# Patient Record
Sex: MALE | Race: OTHER | Employment: FULL TIME | URBAN - METROPOLITAN AREA
[De-identification: names, ages, dates, MRNs, and addresses within clinical notes are randomized per-mention and may not be internally consistent; named-entity substitution may affect disease eponyms.]

---

## 2017-01-09 ENCOUNTER — OFFICE VISIT (OUTPATIENT)
Dept: SURGERY | Facility: CLINIC | Age: 47
End: 2017-01-09

## 2017-01-09 ENCOUNTER — TELEPHONE (OUTPATIENT)
Dept: NEUROLOGY | Facility: CLINIC | Age: 47
End: 2017-01-09

## 2017-01-09 DIAGNOSIS — G56.01 CARPAL TUNNEL SYNDROME ON RIGHT: ICD-10-CM

## 2017-01-09 DIAGNOSIS — G56.02 CARPAL TUNNEL SYNDROME ON LEFT: Primary | ICD-10-CM

## 2017-01-09 PROCEDURE — 99243 OFF/OP CNSLTJ NEW/EST LOW 30: CPT | Performed by: PLASTIC SURGERY

## 2017-01-09 PROCEDURE — 99212 OFFICE O/P EST SF 10 MIN: CPT | Performed by: PLASTIC SURGERY

## 2017-01-09 NOTE — H&P
Ainsley Vega is a 40year old male that presents with Patient presents with:  Carpal Tunnel Syndrome: Left hand  .     REFERRED BY:  James Downing     Pacemaker: No  Latex Allergy: no  Coumadin: No  Plavix: No  Other anticoagulants: No  Cardiac stents: No volar hand at times. Able to complete ADL's. Current Outpatient Prescriptions:  Methotrexate Sodium 2.5 MG Oral Tab Take 8 tablets every Friday. Disp: 40 tablet Rfl: 5   Indomethacin ER 75 MG Oral Cap CR Take one daily with food.  Disp: 90 capsule History Narrative     Family History   Problem Relation Age of Onset   • Diabetes Father    • Hypertension Father    • Cancer Mother      cervical   • Diabetes Mother    • Hypertension Mother    • Depression Sister    • Hypertension Brother        PHYSICAL requires surgery. We discussed the procedure at length, including risks as indicated on the Surgical Request Form. Questions were answered and the patient wishes to proceed with treatment.     Some symptoms may persist   Some symptoms may not improve   So

## 2017-01-18 ENCOUNTER — TELEPHONE (OUTPATIENT)
Dept: NEUROLOGY | Facility: CLINIC | Age: 47
End: 2017-01-18

## 2017-01-19 ENCOUNTER — OFFICE VISIT (OUTPATIENT)
Dept: NEUROLOGY | Facility: CLINIC | Age: 47
End: 2017-01-19

## 2017-01-19 DIAGNOSIS — G56.03 BILATERAL CARPAL TUNNEL SYNDROME: Primary | ICD-10-CM

## 2017-01-19 PROCEDURE — 95886 MUSC TEST DONE W/N TEST COMP: CPT | Performed by: OTHER

## 2017-01-19 PROCEDURE — 95911 NRV CNDJ TEST 9-10 STUDIES: CPT | Performed by: OTHER

## 2017-01-19 NOTE — PATIENT INSTRUCTIONS
As of October 6th 2014, the Drug Enforcement Agency [facility]) is reclassifying all hydrocodone combination medications from Schedule III to Schedule II. This includes medications such as Norco, Vicodin, Lortab, Zohydro, and Vicoprofen.     What this means for y chart.  •

## 2017-01-20 NOTE — PROCEDURES
[address]  Phone: [phone]  Fax: [phone]          Patient: Phil Snowden YOB: 1970  Patient ID: [id number] Hand Dominance: right ? Abnormal interference pattern was found in R. ABD POLL BREVIS, L. ABD POLL BREVIS. Conclusion: Mr. Gregg Orozco, relates a 3 year history of fairly severe left carpal tunnel symptoms of pain, numbness, paresthesias, weakness.   He has had carpal tunnel B.Elbow ADM 7.40 9.8 5.16 B. Elbow - Wrist 26 4.43 59   L ULNAR - ADM      Wrist ADM 2.60 8.1 5.57 Wrist - ADM 8        B. Elbow ADM 7.29 9.0 6.20 B. Elbow - Wrist 27 4.69 58       Sensory NCS      Nerve / Sites Rec.  Site Onset Lat Peak Lat NP Amp PP Amp S

## 2017-01-26 ENCOUNTER — TELEPHONE (OUTPATIENT)
Dept: SURGERY | Facility: CLINIC | Age: 47
End: 2017-01-26

## 2017-01-26 ENCOUNTER — OFFICE VISIT (OUTPATIENT)
Dept: SURGERY | Facility: CLINIC | Age: 47
End: 2017-01-26

## 2017-01-26 DIAGNOSIS — G56.01 CARPAL TUNNEL SYNDROME ON RIGHT: ICD-10-CM

## 2017-01-26 DIAGNOSIS — G56.02 CARPAL TUNNEL SYNDROME ON LEFT: Primary | ICD-10-CM

## 2017-01-26 PROCEDURE — 99214 OFFICE O/P EST MOD 30 MIN: CPT | Performed by: PLASTIC SURGERY

## 2017-01-26 PROCEDURE — 99212 OFFICE O/P EST SF 10 MIN: CPT | Performed by: PLASTIC SURGERY

## 2017-01-26 RX ORDER — HYDROCODONE BITARTRATE AND ACETAMINOPHEN 7.5; 325 MG/1; MG/1
1 TABLET ORAL
Qty: 10 TABLET | Refills: 0 | Status: SHIPPED | OUTPATIENT
Start: 2017-01-26 | End: 2017-02-27

## 2017-01-26 NOTE — PROGRESS NOTES
Pt in the office today to review EMG results from 1/19/17. Last seen in our office 1/9/17 for bilateral carpal tunnel syndrome. Left hand worse then right. C/o constant numbness of volar left hand including LTH, LIF and LMF.  Numbness at night only of R weeks.    ++++++++++++++++++++++++++++++++++++++++++++++++++++++++++++++++++    UPDATE:      46-year-old male, right-hand-dominant, with a 4 year history of progressive numbness. Left thumb index middle constant with burning.   The hand is weak and he has post-operative instructions. The dressing must be carefully cared for, must remain dry,  and cannot be removed under any circumstance. Consistent elevation of the hand above heart level is critical.  Therapy will be necessary post-operatively.   Failure t OTHER SURGICAL HISTORY  2011    Comment right thigh cellulitis       Social History   Marital Status:   Spouse Name: N/A    Years of Education: N/A  Number of Children: N/A     Occupational History  None on file     Social History Main Topics   [employer]

## 2017-01-26 NOTE — PROGRESS NOTES
Pre-op teaching for surgery given to pt. Completed Request for surgery. Went over [employer], Elevation Handout, Narcotic Prescription handout and Hand Surgery Handout. Prescription for Norco given to pt with instructions.   All ques

## 2017-02-03 ENCOUNTER — OFFICE VISIT (OUTPATIENT)
Dept: RHEUMATOLOGY | Facility: CLINIC | Age: 47
End: 2017-02-03

## 2017-02-03 ENCOUNTER — TELEPHONE (OUTPATIENT)
Dept: RHEUMATOLOGY | Facility: CLINIC | Age: 47
End: 2017-02-03

## 2017-02-03 VITALS
SYSTOLIC BLOOD PRESSURE: 137 MMHG | DIASTOLIC BLOOD PRESSURE: 92 MMHG | HEIGHT: 71.5 IN | WEIGHT: 230.88 LBS | BODY MASS INDEX: 31.62 KG/M2 | HEART RATE: 85 BPM

## 2017-02-03 DIAGNOSIS — M87.052 AVASCULAR NECROSIS OF HIP, LEFT (HCC): ICD-10-CM

## 2017-02-03 DIAGNOSIS — G56.03 BILATERAL CARPAL TUNNEL SYNDROME: ICD-10-CM

## 2017-02-03 DIAGNOSIS — M06.1 ADULT-ONSET STILL'S DISEASE (HCC): Primary | ICD-10-CM

## 2017-02-03 PROCEDURE — 99212 OFFICE O/P EST SF 10 MIN: CPT | Performed by: INTERNAL MEDICINE

## 2017-02-03 PROCEDURE — 20526 THER INJECTION CARP TUNNEL: CPT | Performed by: INTERNAL MEDICINE

## 2017-02-03 RX ORDER — METHYLPREDNISOLONE ACETATE 80 MG/ML
40 INJECTION, SUSPENSION INTRA-ARTICULAR; INTRALESIONAL; INTRAMUSCULAR; SOFT TISSUE ONCE
Status: COMPLETED | OUTPATIENT
Start: 2017-02-03 | End: 2017-02-03

## 2017-02-03 NOTE — TELEPHONE ENCOUNTER
Pt called back in stating he can make it in today at 1pm.  Spoke to Loreto who stated it is ok as long as he is here by 1pm - relayed message to pt, he is on his way now and will be there by 1pm.

## 2017-02-03 NOTE — PROCEDURES
Joint Injection  Pre-procedure care:  Consent was obtained, Procedure/risks were explained, Questions were answered, Patient was prepped and draped in the usual sterile fashion, Correct side and site confirmed and Correct patient identified  Adult-onset st

## 2017-02-03 NOTE — PROGRESS NOTES
HPI:    Patient ID: Mar Victor is a 40year old male.     HPI Comments: Tyler Brothers is a 52-year-old gentleman with history of adult-onset still's disease, which presented with fevers, rash, and inflammatory arthritis, which was treated with high-dose steroids rich hydrocodone-acetaminophen 7.5-325 MG Oral Tab Take one every 6 hours PRN pain. Disp: 120 tablet Rfl: 0   Cyclobenzaprine HCl 10 MG Oral Tab Take 10 mg by mouth as needed for Muscle spasms.  Disp:  Rfl:    LISINOPRIL 20 MG Oral Tab TAKE 1 TABLET BY MOUTH E Placed This Encounter  therapeutic carpal tunnel injection    Meds This Visit:  No prescriptions requested or ordered in this encounter    Imaging & Referrals:  None       RX#[id number]

## 2017-02-03 NOTE — TELEPHONE ENCOUNTER
Pt is calling state that he is having pain in the hand pt state that he spoke with a the surgeon and they inform him that it was ok for him to get the cortisone injection  Pt is requesting to speak with a RN

## 2017-02-03 NOTE — TELEPHONE ENCOUNTER
Please see below. Pt stated he is having severe left hand pain. He is schedule for surgery for 3/3/17 with Dr. Jerman Blum. Pt stated the only thing that helps for pain is cortisone injection. Reports Dr. Giovany Pedro does not do cortisone injections.  Pt last

## 2017-02-08 NOTE — TELEPHONE ENCOUNTER
Spoke with pt, will bring on Fri. 02/10 FMLA form and will sign release at [facility].  NK

## 2017-02-10 NOTE — TELEPHONE ENCOUNTER
Spoke to [employer] @ [facility] P[employer] (call ref# [id number])    Effective date: 1/1/17    Individual Deduct: Does not apply  Family Deduct: $ 3,250.00 with $ 3,106.81 met  Individual OOP: $ 6,000.00 with $ 925.16 met  Family OOP:$ 12,000.00 with $ 3,106.81 met  Co-I

## 2017-02-23 ENCOUNTER — TELEPHONE (OUTPATIENT)
Dept: SURGERY | Facility: CLINIC | Age: 47
End: 2017-02-23

## 2017-02-23 NOTE — H&P
H&P UPDATE:      NEEDS R ECTR -minimal symptoms    1/9/17 multiple carpal tunnel steroid injections on the left    Pacemaker: No  Latex Allergy: no  Coumadin: No  Plavix: No  Other anticoagulants: No  Cardiac stents: No    HAND DOMINANCE: Right  Profession Allergies:   No Known Allergies    Past Medical History   Diagnosis Date   • Tinnitus    • Still's disease ([facility] [address]    • Pneumonia 2010     left lower lobe - out pt.  antibiotics   • MRSA (methicillin resistant Staphylococcus aureus) 2011     MRSA groin hand.  C/o constant at times sharp pain of L volar hand.  Rates pain 10/10.  R volar hand with pain at night only.  Taking Norco , slightly effective. Wakes pt every night. Wears splints bilaterally at night, effective for R hand only.   Weakness of L hand motion with excellent wrist hyperextension  Bilateral thenar intrinsics intact symmetric    2 point discrimination left thumb 7, all others 5  Tinel left thumb index middle, right negative  Phalen left thumb index middle, right index middle ring    IMPRESS and could lead to permanent pain, stiffness, weakness, and loss of function. Even with satisfactory healing, the hand/digit may not regain normal ROM or normal function.     PLAN RECTR; Sharlon Castleman in approximately 6 weeks              Hernán Lu PMH:  Topics    Smoking status:  Former Smoker    For 10.00 Years        Types:  Cigarettes      Quit date:  07/25/2012      Smokeless tobacco:  Former [facility]     Quit date:  07/25/2012      Comment:  1 pk would last a week      Alcohol Use:  Yes    0.0 oz/week

## 2017-02-23 NOTE — TELEPHONE ENCOUNTER
Spoke with patient. All changes to medications and allergies, per patient report, have been documented in the medical record.      Patient  has not been  hospitalized, or had any surgical procedures since last seen in our office on 1/26/17    Pt did ha

## 2017-02-27 DIAGNOSIS — M87.052 AVASCULAR NECROSIS OF HIP, LEFT (HCC): Primary | ICD-10-CM

## 2017-02-27 RX ORDER — HYDROCODONE BITARTRATE AND ACETAMINOPHEN 7.5; 325 MG/1; MG/1
TABLET ORAL
Qty: 120 TABLET | Refills: 0 | Status: SHIPPED | OUTPATIENT
Start: 2017-02-27 | End: 2017-04-08

## 2017-02-27 NOTE — TELEPHONE ENCOUNTER
Pt stts refill on Rx hydrocodone 7.5-325mg for 120 tablets. Pt stts he is out of medication.  Please call when script is ready for pickup         Current Outpatient Prescriptions:                    hydrocodone-acetaminophen 7.5-325 MG Oral Tab Take one zofia

## 2017-02-27 NOTE — TELEPHONE ENCOUNTER
Pt contacted aware script approved and to  at [facility] [employer] . Pt verbalized understanding.

## 2017-02-27 NOTE — TELEPHONE ENCOUNTER
LOV: 2/3/17 LR 12/14/16 #120  Future Appointments  Date Time Provider Renan Thurston   3/3/2017 9:00 AM Ary Hyatt MD [facility]   3/6/2017 9:30 AM Maddison CUELLARBFLESLYE [facility]   3/8/2017 10:00 AM Maddison SUNSHINEDR

## 2017-02-28 NOTE — OR PREOP
Pt. stated he has been on COA diet aid-instructed to stop and call Dr. Viktoriya Sy office to let him know.

## 2017-03-03 ENCOUNTER — ANESTHESIA EVENT (OUTPATIENT)
Dept: SURGERY | Facility: HOSPITAL | Age: 47
End: 2017-03-03
Payer: COMMERCIAL

## 2017-03-03 ENCOUNTER — HOSPITAL DOCUMENTATION (OUTPATIENT)
Dept: SURGERY | Facility: CLINIC | Age: 47
End: 2017-03-03

## 2017-03-03 ENCOUNTER — HOSPITAL ENCOUNTER (OUTPATIENT)
Facility: HOSPITAL | Age: 47
Setting detail: HOSPITAL OUTPATIENT SURGERY
Discharge: HOME | End: 2017-03-03
Attending: PLASTIC SURGERY | Admitting: PLASTIC SURGERY
Payer: COMMERCIAL

## 2017-03-03 ENCOUNTER — ANESTHESIA (OUTPATIENT)
Dept: SURGERY | Facility: HOSPITAL | Age: 47
End: 2017-03-03
Payer: COMMERCIAL

## 2017-03-03 ENCOUNTER — SURGERY (OUTPATIENT)
Age: 47
End: 2017-03-03

## 2017-03-03 VITALS
SYSTOLIC BLOOD PRESSURE: 118 MMHG | OXYGEN SATURATION: 97 % | HEART RATE: 53 BPM | TEMPERATURE: 98 F | BODY MASS INDEX: 30.26 KG/M2 | RESPIRATION RATE: 18 BRPM | DIASTOLIC BLOOD PRESSURE: 76 MMHG | HEIGHT: 71.5 IN | WEIGHT: 221 LBS

## 2017-03-03 DIAGNOSIS — G56.02 CARPAL TUNNEL SYNDROME ON LEFT: Primary | ICD-10-CM

## 2017-03-03 DIAGNOSIS — G56.01 CARPAL TUNNEL SYNDROME ON RIGHT: ICD-10-CM

## 2017-03-03 PROCEDURE — 64450 NJX AA&/STRD OTHER PN/BRANCH: CPT | Performed by: ANESTHESIOLOGY

## 2017-03-03 PROCEDURE — 01N54ZZ RELEASE MEDIAN NERVE, PERCUTANEOUS ENDOSCOPIC APPROACH: ICD-10-PCS | Performed by: PLASTIC SURGERY

## 2017-03-03 PROCEDURE — 29848 WRIST ENDOSCOPY/SURGERY: CPT | Performed by: PLASTIC SURGERY

## 2017-03-03 RX ORDER — HYDROMORPHONE HYDROCHLORIDE 1 MG/ML
0.2 INJECTION, SOLUTION INTRAMUSCULAR; INTRAVENOUS; SUBCUTANEOUS EVERY 5 MIN PRN
Status: DISCONTINUED | OUTPATIENT
Start: 2017-03-03 | End: 2017-03-03

## 2017-03-03 RX ORDER — HYDROCODONE BITARTRATE AND ACETAMINOPHEN 5; 325 MG/1; MG/1
2 TABLET ORAL AS NEEDED
Status: COMPLETED | OUTPATIENT
Start: 2017-03-03 | End: 2017-03-03

## 2017-03-03 RX ORDER — LIDOCAINE HYDROCHLORIDE 5 MG/ML
INJECTION, SOLUTION INFILTRATION; INTRAVENOUS AS NEEDED
Status: DISCONTINUED | OUTPATIENT
Start: 2017-03-03 | End: 2017-03-03 | Stop reason: SURG

## 2017-03-03 RX ORDER — ACETAMINOPHEN 325 MG/1
650 TABLET ORAL ONCE
Status: COMPLETED | OUTPATIENT
Start: 2017-03-03 | End: 2017-03-03

## 2017-03-03 RX ORDER — MORPHINE SULFATE 4 MG/ML
4 INJECTION, SOLUTION INTRAMUSCULAR; INTRAVENOUS EVERY 10 MIN PRN
Status: DISCONTINUED | OUTPATIENT
Start: 2017-03-03 | End: 2017-03-03

## 2017-03-03 RX ORDER — HYDROCODONE BITARTRATE AND ACETAMINOPHEN 7.5; 325 MG/1; MG/1
1 TABLET ORAL EVERY 4 HOURS PRN
Status: DISCONTINUED | OUTPATIENT
Start: 2017-03-03 | End: 2017-03-03

## 2017-03-03 RX ORDER — NALOXONE HYDROCHLORIDE 0.4 MG/ML
80 INJECTION, SOLUTION INTRAMUSCULAR; INTRAVENOUS; SUBCUTANEOUS AS NEEDED
Status: DISCONTINUED | OUTPATIENT
Start: 2017-03-03 | End: 2017-03-03

## 2017-03-03 RX ORDER — HYDROMORPHONE HYDROCHLORIDE 1 MG/ML
0.6 INJECTION, SOLUTION INTRAMUSCULAR; INTRAVENOUS; SUBCUTANEOUS EVERY 5 MIN PRN
Status: DISCONTINUED | OUTPATIENT
Start: 2017-03-03 | End: 2017-03-03

## 2017-03-03 RX ORDER — SODIUM CHLORIDE, SODIUM LACTATE, POTASSIUM CHLORIDE, CALCIUM CHLORIDE 600; 310; 30; 20 MG/100ML; MG/100ML; MG/100ML; MG/100ML
INJECTION, SOLUTION INTRAVENOUS CONTINUOUS
Status: DISCONTINUED | OUTPATIENT
Start: 2017-03-03 | End: 2017-03-03

## 2017-03-03 RX ORDER — SODIUM CHLORIDE, SODIUM LACTATE, POTASSIUM CHLORIDE, CALCIUM CHLORIDE 600; 310; 30; 20 MG/100ML; MG/100ML; MG/100ML; MG/100ML
INJECTION, SOLUTION INTRAVENOUS CONTINUOUS PRN
Status: DISCONTINUED | OUTPATIENT
Start: 2017-03-03 | End: 2017-03-03 | Stop reason: SURG

## 2017-03-03 RX ORDER — KETOROLAC TROMETHAMINE 30 MG/ML
INJECTION, SOLUTION INTRAMUSCULAR; INTRAVENOUS AS NEEDED
Status: DISCONTINUED | OUTPATIENT
Start: 2017-03-03 | End: 2017-03-03 | Stop reason: SURG

## 2017-03-03 RX ORDER — HYDROMORPHONE HYDROCHLORIDE 1 MG/ML
0.4 INJECTION, SOLUTION INTRAMUSCULAR; INTRAVENOUS; SUBCUTANEOUS EVERY 5 MIN PRN
Status: DISCONTINUED | OUTPATIENT
Start: 2017-03-03 | End: 2017-03-03

## 2017-03-03 RX ORDER — METOCLOPRAMIDE 10 MG/1
10 TABLET ORAL ONCE
Status: COMPLETED | OUTPATIENT
Start: 2017-03-03 | End: 2017-03-03

## 2017-03-03 RX ORDER — MORPHINE SULFATE 10 MG/ML
6 INJECTION, SOLUTION INTRAMUSCULAR; INTRAVENOUS EVERY 10 MIN PRN
Status: DISCONTINUED | OUTPATIENT
Start: 2017-03-03 | End: 2017-03-03

## 2017-03-03 RX ORDER — HYDROCODONE BITARTRATE AND ACETAMINOPHEN 5; 325 MG/1; MG/1
1 TABLET ORAL AS NEEDED
Status: COMPLETED | OUTPATIENT
Start: 2017-03-03 | End: 2017-03-03

## 2017-03-03 RX ORDER — MIDAZOLAM HYDROCHLORIDE 1 MG/ML
INJECTION INTRAMUSCULAR; INTRAVENOUS AS NEEDED
Status: DISCONTINUED | OUTPATIENT
Start: 2017-03-03 | End: 2017-03-03 | Stop reason: SURG

## 2017-03-03 RX ORDER — ONDANSETRON 2 MG/ML
4 INJECTION INTRAMUSCULAR; INTRAVENOUS ONCE AS NEEDED
Status: DISCONTINUED | OUTPATIENT
Start: 2017-03-03 | End: 2017-03-03

## 2017-03-03 RX ORDER — LIDOCAINE HYDROCHLORIDE 10 MG/ML
INJECTION, SOLUTION EPIDURAL; INFILTRATION; INTRACAUDAL; PERINEURAL AS NEEDED
Status: DISCONTINUED | OUTPATIENT
Start: 2017-03-03 | End: 2017-03-03 | Stop reason: SURG

## 2017-03-03 RX ORDER — FAMOTIDINE 20 MG/1
20 TABLET ORAL ONCE
Status: COMPLETED | OUTPATIENT
Start: 2017-03-03 | End: 2017-03-03

## 2017-03-03 RX ORDER — MORPHINE SULFATE 2 MG/ML
2 INJECTION, SOLUTION INTRAMUSCULAR; INTRAVENOUS EVERY 10 MIN PRN
Status: DISCONTINUED | OUTPATIENT
Start: 2017-03-03 | End: 2017-03-03

## 2017-03-03 RX ADMIN — KETOROLAC TROMETHAMINE 30 MG: 30 INJECTION, SOLUTION INTRAMUSCULAR; INTRAVENOUS at 10:15:00

## 2017-03-03 RX ADMIN — SODIUM CHLORIDE, SODIUM LACTATE, POTASSIUM CHLORIDE, CALCIUM CHLORIDE: 600; 310; 30; 20 INJECTION, SOLUTION INTRAVENOUS at 09:11:00

## 2017-03-03 RX ADMIN — LIDOCAINE HYDROCHLORIDE 50 ML: 5 INJECTION, SOLUTION INFILTRATION; INTRAVENOUS at 09:20:00

## 2017-03-03 RX ADMIN — LIDOCAINE HYDROCHLORIDE 50 MG: 10 INJECTION, SOLUTION EPIDURAL; INFILTRATION; INTRACAUDAL; PERINEURAL at 09:14:00

## 2017-03-03 RX ADMIN — SODIUM CHLORIDE, SODIUM LACTATE, POTASSIUM CHLORIDE, CALCIUM CHLORIDE: 600; 310; 30; 20 INJECTION, SOLUTION INTRAVENOUS at 10:13:00

## 2017-03-03 RX ADMIN — MIDAZOLAM HYDROCHLORIDE 2 MG: 1 INJECTION INTRAMUSCULAR; INTRAVENOUS at 09:11:00

## 2017-03-03 NOTE — ANESTHESIA PROCEDURE NOTES
Peripheral Block    Anesthesiologist:  Romina Peters  CRNA:  LESS, [address]  Performed by:   Anesthesiologist and CRNA  Start Time:  3/3/2017 9:15 AM  End Time:  3/3/2017 9:22 AM  Site Identification: surface landmarks    Reason for Block: at surgeon's req

## 2017-03-03 NOTE — BRIEF OP NOTE
Cristy 46  Brief Op Note     Marilin Crawford Location: OR   [facility] [id number] MRN [id number]   Admission Date 3/3/2017 Operation Date 3/3/2017   Attending Physician Lorrie Grant MD Operating Physician Yessenia Chen MD

## 2017-03-03 NOTE — ANESTHESIA PREPROCEDURE EVALUATION
Anesthesia PreOp Note    HPI:     Claudene Apo is a 40year old male who presents for preoperative consultation requested by: Trang Beltre MD    Date of Surgery: 3/3/2017    Procedure(s):  ENDOSCOPIC CARPAL TUNNEL RELEASE  Indication: Carpal tunnel differently: Take 75 mg by mouth nightly. Take one daily with food. ) Disp: 90 capsule Rfl: 1 Past Week at Unknown time   Cyclobenzaprine HCl 10 MG Oral Tab Take 10 mg by mouth as needed for Muscle spasms.  Disp:  Rfl:  Past Month at Unknown time   LISINOPR 135/83 and his pulse is 65. His respiration is 14 and oxygen saturation is 96%.     02/28/17  1212 03/03/17  0748   BP:  135/83   Pulse:  65   Temp:  97.7 °F (36.5 °C)   TempSrc:  Oral   Resp:  14   Height: 5' 11.5\" 5' 11.5\"   Weight: 224 lb 221 lb   SpO2

## 2017-03-03 NOTE — H&P (VIEW-ONLY)
H&P UPDATE:      NEEDS R ECTR -minimal symptoms    1/9/17 multiple carpal tunnel steroid injections on the left    Pacemaker: No  Latex Allergy: no  Coumadin: No  Plavix: No  Other anticoagulants: No  Cardiac stents: No    HAND DOMINANCE: Right  Profession Allergies:   No Known Allergies    Past Medical History   Diagnosis Date   • Tinnitus    • Still's disease ([facility] [address]    • Pneumonia 2010     left lower lobe - out pt.  antibiotics   • MRSA (methicillin resistant Staphylococcus aureus) 2011     MRSA groin hand.  C/o constant at times sharp pain of L volar hand.  Rates pain 10/10.  R volar hand with pain at night only.  Taking Norco , slightly effective. Wakes pt every night. Wears splints bilaterally at night, effective for R hand only.   Weakness of L hand motion with excellent wrist hyperextension  Bilateral thenar intrinsics intact symmetric    2 point discrimination left thumb 7, all others 5  Tinel left thumb index middle, right negative  Phalen left thumb index middle, right index middle ring    IMPRESS and could lead to permanent pain, stiffness, weakness, and loss of function. Even with satisfactory healing, the hand/digit may not regain normal ROM or normal function.     MESERET Sagastume in approximately 6 weeks              Rebekah Rader PMH:  Topics    Smoking status:  Former Smoker    For 10.00 Years        Types:  Cigarettes      Quit date:  07/25/2012      Smokeless tobacco:  Former [facility]     Quit date:  07/25/2012      Comment:  1 pk would last a week      Alcohol Use:  Yes    0.0 oz/week

## 2017-03-03 NOTE — INTERVAL H&P NOTE
Pre-op Diagnosis: Carpal tunnel syndrome     The above referenced H&P was reviewed by Gela Rhoades MD on 3/3/2017, the patient was examined and no significant changes have occurred in the patient's condition since the H&P was performed.   I discus

## 2017-03-03 NOTE — ANESTHESIA POSTPROCEDURE EVALUATION
Patient: Pardeep Becker    Procedure Summary     Date Anesthesia Start Anesthesia Stop Room / Location    03/03/17 0911 1023 [address] [facility] MAIN OR 01 / 300 [facility] MAIN OR       Procedure Diagnosis Surgeon Responsible Provider    ENDOSCOPIC CARPAL TUNNEL RELEASE (Left ) (Carpal

## 2017-03-04 NOTE — OPERATIVE REPORT
[facility]    PATIENT'S NAME: Marshal Gar   ATTENDING PHYSICIAN: Migdalia Pham MD   OPERATING PHYSICIAN: Migdalia Pham MD   PATIENT ACCOUNT#:   [id number]    LOCATION:  [address]  MEDICAL RECORD #:   [id number]       KECIA knife, and a pull knife. We had excellent herniation of palmar fat into the cannula. The cannula and endoscope were withdrawn. A curved dissector was used to document complete release of the fascia proximally and the transverse carpal ligament distally.

## 2017-03-06 ENCOUNTER — OFFICE VISIT (OUTPATIENT)
Dept: SURGERY | Facility: CLINIC | Age: 47
End: 2017-03-06

## 2017-03-06 ENCOUNTER — TELEPHONE (OUTPATIENT)
Dept: SURGERY | Facility: CLINIC | Age: 47
End: 2017-03-06

## 2017-03-06 DIAGNOSIS — M62.81 DISTAL MUSCLE WEAKNESS: ICD-10-CM

## 2017-03-06 DIAGNOSIS — M25.642 STIFFNESS OF JOINT, HAND, LEFT: Primary | ICD-10-CM

## 2017-03-06 DIAGNOSIS — M87.052 AVASCULAR NECROSIS OF HIP, LEFT (HCC): Primary | ICD-10-CM

## 2017-03-06 NOTE — PROGRESS NOTES
Carpal Tunnel Post - Op Note:    Subjective: I need some more pain medication.       Objective:  Occupational Therapy completed the following educational areas status post elective carpal tunnel procedure:    1)  Dressing was removed and handwashing techniq

## 2017-03-06 NOTE — TELEPHONE ENCOUNTER
Left message for post-op pt to call the office with any questions and/or concerns. He is to be seen in OT Monday 3-6-17. I will check with patient during visit for questions and/or issues. Dr. Eden Spencer notified.

## 2017-03-06 NOTE — TELEPHONE ENCOUNTER
Patient here for OT. Pt c/o moderate to severe pain and states he had a \"rough\" weekend due to pain control. Inquired why he did not call the office, no reason given. Arrived with hand elevated, but hand has moderate edema.   Importance of elevation di

## 2017-03-15 ENCOUNTER — OFFICE VISIT (OUTPATIENT)
Dept: SURGERY | Facility: CLINIC | Age: 47
End: 2017-03-15

## 2017-03-15 DIAGNOSIS — M62.81 DISTAL MUSCLE WEAKNESS: ICD-10-CM

## 2017-03-15 DIAGNOSIS — M25.642 STIFFNESS OF JOINT, HAND, LEFT: Primary | ICD-10-CM

## 2017-03-15 PROCEDURE — 97165 OT EVAL LOW COMPLEX 30 MIN: CPT | Performed by: OCCUPATIONAL THERAPIST

## 2017-03-15 PROCEDURE — 99070 SPECIAL SUPPLIES PHYS/QHP: CPT | Performed by: OCCUPATIONAL THERAPIST

## 2017-03-15 NOTE — PROGRESS NOTES
OCCUPATIONAL THERAPY EVALUATION:   Marilin Alselma   [id number]       SUBJECTIVE:    HX of Injury: Progressive left hand pain and numbness. Chief Complaint:   None. Precautions: Limited use of the left hand. Premorbid Functional Status: Independent w/ Occ. weeks or a total of 4 visits. Pt. was advised regarding the findings of this evaluation and agrees to the plan of care. Carmenza Rocha OTRL       I have reviewed the treatment plan and concur.    Lona Gloria MD

## 2017-03-23 ENCOUNTER — OFFICE VISIT (OUTPATIENT)
Dept: SURGERY | Facility: CLINIC | Age: 47
End: 2017-03-23

## 2017-03-23 DIAGNOSIS — G56.02 CARPAL TUNNEL SYNDROME ON LEFT: Primary | ICD-10-CM

## 2017-03-23 DIAGNOSIS — M62.81 DISTAL MUSCLE WEAKNESS: ICD-10-CM

## 2017-03-23 DIAGNOSIS — M25.642 STIFFNESS OF JOINT, HAND, LEFT: Primary | ICD-10-CM

## 2017-03-23 DIAGNOSIS — G56.01 CARPAL TUNNEL SYNDROME ON RIGHT: ICD-10-CM

## 2017-03-23 PROCEDURE — 99024 POSTOP FOLLOW-UP VISIT: CPT | Performed by: PLASTIC SURGERY

## 2017-03-23 PROCEDURE — 97110 THERAPEUTIC EXERCISES: CPT | Performed by: OCCUPATIONAL THERAPIST

## 2017-03-23 PROCEDURE — 99212 OFFICE O/P EST SF 10 MIN: CPT | Performed by: PLASTIC SURGERY

## 2017-03-23 NOTE — PROGRESS NOTES
Subjective: There is some discomfort with direct pressure in the palm of the left hand. Objective:     Current level of performance:  ADL: Independent. Work: Returning to full unrestricted duty. Leisure: Not discussed.     Measurements/Tests:  ROM:

## 2017-03-23 NOTE — PROGRESS NOTES
Surgery 1: LECTR  - Date: 03/03/17  - Days Since: 20                   Patient doing well. Has c/o discomfort in center of palm at wrist.  States pain is gone, but tingling is still present intermittently. He is pleased pain is improved.   Surgical wounds

## 2017-04-08 DIAGNOSIS — M87.052 AVASCULAR NECROSIS OF HIP, LEFT (HCC): Primary | ICD-10-CM

## 2017-04-08 NOTE — TELEPHONE ENCOUNTER
Pt is asking for a refill on his medication. Please advise when ready for . Pt is out of medication, please advise.        Current outpatient prescriptions:   •  hydrocodone-acetaminophen 7.5-325 MG Oral Tab, Take one every 6 hours PRN pain., Disp:

## 2017-04-10 RX ORDER — HYDROCODONE BITARTRATE AND ACETAMINOPHEN 7.5; 325 MG/1; MG/1
TABLET ORAL
Qty: 120 TABLET | Refills: 0 | Status: SHIPPED | OUTPATIENT
Start: 2017-04-10 | End: 2017-07-05

## 2017-05-15 ENCOUNTER — OFFICE VISIT (OUTPATIENT)
Dept: INTERNAL MEDICINE CLINIC | Facility: CLINIC | Age: 47
End: 2017-05-15

## 2017-05-15 VITALS
SYSTOLIC BLOOD PRESSURE: 136 MMHG | WEIGHT: 220 LBS | OXYGEN SATURATION: 97 % | HEART RATE: 83 BPM | HEIGHT: 71.5 IN | TEMPERATURE: 99 F | BODY MASS INDEX: 30.13 KG/M2 | DIASTOLIC BLOOD PRESSURE: 64 MMHG

## 2017-05-15 DIAGNOSIS — J06.9 ACUTE URI: Primary | ICD-10-CM

## 2017-05-15 PROCEDURE — 99213 OFFICE O/P EST LOW 20 MIN: CPT | Performed by: INTERNAL MEDICINE

## 2017-05-15 PROCEDURE — 99212 OFFICE O/P EST SF 10 MIN: CPT | Performed by: INTERNAL MEDICINE

## 2017-05-15 RX ORDER — AMOXICILLIN AND CLAVULANATE POTASSIUM 875; 125 MG/1; MG/1
1 TABLET, FILM COATED ORAL 2 TIMES DAILY WITH MEALS
Qty: 14 TABLET | Refills: 0 | Status: SHIPPED | OUTPATIENT
Start: 2017-05-15 | End: 2017-05-18 | Stop reason: ALTCHOICE

## 2017-05-15 NOTE — PATIENT INSTRUCTIONS
Please take Augmentin twice daily with meals for 7 days. Treat symptoms with pseudoephedrine, warm salt water gargles and Robitussin as needed. Call if not soon better.

## 2017-05-15 NOTE — PROGRESS NOTES
Pat Nunez is a 40year old male.  Patient presents with:  Cough  Sore Throat    HPI:   Since Monday, 1 week ago, he has had worsening symptoms of intermittent sweats and chills, nasal congestion with green drainage, periorbital sinus pressure and pain, sor vomiting)    • Carpal tunnel syndrome, left       Social History:    Smoking Status: Former Smoker                   Packs/Day: 0.00  Years: 10        Types: Cigarettes      Quit date: 07/25/2012    Smokeless Status: Former User                         Surjit Broccoli

## 2017-05-18 ENCOUNTER — TELEPHONE (OUTPATIENT)
Dept: INTERNAL MEDICINE CLINIC | Facility: CLINIC | Age: 47
End: 2017-05-18

## 2017-05-18 RX ORDER — AZITHROMYCIN 250 MG/1
TABLET, FILM COATED ORAL
Qty: 1 PACKAGE | Refills: 0 | Status: SHIPPED | OUTPATIENT
Start: 2017-05-18 | End: 2017-09-06

## 2017-05-18 NOTE — TELEPHONE ENCOUNTER
Pt calling regarding having a Upper respitory infection. Pt want to see Dr. Ilia Pemberton on May 15 and was given medication. Pt state he has not gotten any better. .. please advise

## 2017-05-18 NOTE — TELEPHONE ENCOUNTER
Advised patient of Dr. Alexandra Rivera note. Patient verbalized understanding. Rx sent to pharmacy.

## 2017-05-18 NOTE — TELEPHONE ENCOUNTER
Dr. Joni Wilhelm, pt saw you 5/15/17 for URI and sinusitis. He has no improvement with Augmentin. Denies new symptoms. I advised that he complete his abx.  He asked if you would change tot he RX to the z-alin as it has worked for him in the past. I reviewed abx res

## 2017-07-05 DIAGNOSIS — M87.052 AVASCULAR NECROSIS OF HIP, LEFT (HCC): ICD-10-CM

## 2017-07-05 RX ORDER — HYDROCODONE BITARTRATE AND ACETAMINOPHEN 7.5; 325 MG/1; MG/1
TABLET ORAL
Qty: 120 TABLET | Refills: 0 | Status: SHIPPED | OUTPATIENT
Start: 2017-07-05 | End: 2017-09-06

## 2017-07-05 NOTE — TELEPHONE ENCOUNTER
Dr. Saw Montoya, Rx request for Hydrocodone-Acetaminophen 7/5-325mg, Please review. Thank you. LOV: 2/3/17  Last Refill: 4/10/17 #120 with 0 Refills.      Refill Protocol Appointment Criteria  · Appointment scheduled in the past 6 months or in the next 3 m

## 2017-07-05 NOTE — TELEPHONE ENCOUNTER
Pt requesting refill. Pt stts he is out of medication. hydrocodone-acetaminophen 7.5-325 MG Oral Tab Take one every 6 hours PRN pain.  Disp: 120 tablet Rfl: 0

## 2017-07-06 NOTE — TELEPHONE ENCOUNTER
Left message letting patient know Rx for Hydrocodone is ready for pickup at the [facility] office.

## 2017-08-17 ENCOUNTER — TELEPHONE (OUTPATIENT)
Dept: RHEUMATOLOGY | Facility: CLINIC | Age: 47
End: 2017-08-17

## 2017-08-17 RX ORDER — FOLIC ACID 1 MG/1
TABLET ORAL
Qty: 180 TABLET | Refills: 3 | Status: SHIPPED | OUTPATIENT
Start: 2017-08-17 | End: 2017-12-06

## 2017-08-17 NOTE — TELEPHONE ENCOUNTER
Pt needs a refill for med below. folic acid 1 MG Oral Tab 180 tablet 3 6/16/2016     Sig - Route:  Take 2 tablets (2 mg total) by mouth once daily. - Oral    E-Prescribing Status: Receipt confirmed by pharmacy (6/16/2016  3:26 PM CDT)

## 2017-08-17 NOTE — TELEPHONE ENCOUNTER
LOV: 2/3/17  Future Appointments  Date Time Provider Renan Thurston   9/6/2017 9:00 AM Sea Mendoza MD [facility] [place]     Labs:   Component      Latest Ref Rng & Units 10/4/2016   WBC      4.0 - 11.0 K/UL 4.9   RBC      4.50 - 5.90 M/UL 4.

## 2017-09-02 NOTE — PROGRESS NOTES
Feli Anderson is a 41-year-old gentleman with history of adult-onset still's disease, which presented with fevers, rash, and inflammatory arthritis, which was treated with high-dose steroids originally. He remains on methotrexate 20 mg weekly.  He has had no recurre Oral Tab Take one every 6 hours PRN pain. Disp: 120 tablet Rfl: 0   Cyclobenzaprine HCl 10 MG Oral Tab Take 10 mg by mouth as needed for Muscle spasms.  Disp:  Rfl:    LISINOPRIL 20 MG Oral Tab TAKE 1 TABLET BY MOUTH EVERY DAY Disp: 60 tablet Rfl: 5   folic results.

## 2017-09-06 ENCOUNTER — LAB ENCOUNTER (OUTPATIENT)
Dept: LAB | Age: 47
End: 2017-09-06
Attending: INTERNAL MEDICINE
Payer: COMMERCIAL

## 2017-09-06 ENCOUNTER — NURSE TRIAGE (OUTPATIENT)
Dept: OTHER | Age: 47
End: 2017-09-06

## 2017-09-06 ENCOUNTER — TELEPHONE (OUTPATIENT)
Dept: RHEUMATOLOGY | Facility: CLINIC | Age: 47
End: 2017-09-06

## 2017-09-06 ENCOUNTER — OFFICE VISIT (OUTPATIENT)
Dept: RHEUMATOLOGY | Facility: CLINIC | Age: 47
End: 2017-09-06

## 2017-09-06 ENCOUNTER — HOSPITAL ENCOUNTER (OUTPATIENT)
Dept: GENERAL RADIOLOGY | Age: 47
Discharge: HOME | End: 2017-09-06
Attending: INTERNAL MEDICINE
Payer: COMMERCIAL

## 2017-09-06 VITALS
OXYGEN SATURATION: 97 % | TEMPERATURE: 98 F | BODY MASS INDEX: 32.17 KG/M2 | WEIGHT: 229.81 LBS | HEIGHT: 71 IN | HEART RATE: 76 BPM | DIASTOLIC BLOOD PRESSURE: 100 MMHG | SYSTOLIC BLOOD PRESSURE: 138 MMHG

## 2017-09-06 DIAGNOSIS — M87.052 AVASCULAR NECROSIS OF HIP, LEFT (HCC): ICD-10-CM

## 2017-09-06 DIAGNOSIS — M25.571 CHRONIC PAIN OF RIGHT ANKLE: ICD-10-CM

## 2017-09-06 DIAGNOSIS — Z51.81 ENCOUNTER FOR THERAPEUTIC DRUG MONITORING: ICD-10-CM

## 2017-09-06 DIAGNOSIS — G89.29 CHRONIC PAIN OF RIGHT ANKLE: ICD-10-CM

## 2017-09-06 DIAGNOSIS — M06.1 ADULT-ONSET STILL'S DISEASE (HCC): ICD-10-CM

## 2017-09-06 DIAGNOSIS — M87.052 AVASCULAR NECROSIS OF BONE OF LEFT HIP (HCC): ICD-10-CM

## 2017-09-06 DIAGNOSIS — M06.1 ADULT-ONSET STILL'S DISEASE (HCC): Primary | ICD-10-CM

## 2017-09-06 LAB
ALBUMIN SERPL BCP-MCNC: 3.7 G/DL (ref 3.5–4.8)
AST SERPL-CCNC: 19 U/L (ref 15–41)
BASOPHILS # BLD: 0 K/UL (ref 0–0.2)
BASOPHILS NFR BLD: 1 %
CREAT SERPL-MCNC: 0.88 MG/DL (ref 0.5–1.5)
CRP SERPL-MCNC: <0.5 MG/DL (ref 0–0.9)
EOSINOPHIL # BLD: 0.1 K/UL (ref 0–0.7)
EOSINOPHIL NFR BLD: 2 %
ERYTHROCYTE [DISTWIDTH] IN BLOOD BY AUTOMATED COUNT: 13.8 % (ref 11–15)
ERYTHROCYTE [SEDIMENTATION RATE] IN BLOOD: 6 MM/HR (ref 0–15)
HCT VFR BLD AUTO: 42.9 % (ref 41–52)
HGB BLD-MCNC: 14.1 G/DL (ref 13.5–17.5)
LYMPHOCYTES # BLD: 1 K/UL (ref 1–4)
LYMPHOCYTES NFR BLD: 16 %
MCH RBC QN AUTO: 29.7 PG (ref 27–32)
MCHC RBC AUTO-ENTMCNC: 32.8 G/DL (ref 32–37)
MCV RBC AUTO: 90.5 FL (ref 80–100)
MONOCYTES # BLD: 0.6 K/UL (ref 0–1)
MONOCYTES NFR BLD: 9 %
NEUTROPHILS # BLD AUTO: 4.5 K/UL (ref 1.8–7.7)
NEUTROPHILS NFR BLD: 73 %
PLATELET # BLD AUTO: 289 K/UL (ref 140–400)
PMV BLD AUTO: 7.8 FL (ref 7.4–10.3)
RBC # BLD AUTO: 4.74 M/UL (ref 4.5–5.9)
WBC # BLD AUTO: 6.2 K/UL (ref 4–11)

## 2017-09-06 PROCEDURE — 36415 COLL VENOUS BLD VENIPUNCTURE: CPT

## 2017-09-06 PROCEDURE — 99214 OFFICE O/P EST MOD 30 MIN: CPT | Performed by: INTERNAL MEDICINE

## 2017-09-06 PROCEDURE — 99212 OFFICE O/P EST SF 10 MIN: CPT | Performed by: INTERNAL MEDICINE

## 2017-09-06 PROCEDURE — 82565 ASSAY OF CREATININE: CPT

## 2017-09-06 PROCEDURE — 84450 TRANSFERASE (AST) (SGOT): CPT

## 2017-09-06 PROCEDURE — 85025 COMPLETE CBC W/AUTO DIFF WBC: CPT

## 2017-09-06 PROCEDURE — 82040 ASSAY OF SERUM ALBUMIN: CPT

## 2017-09-06 PROCEDURE — 73610 X-RAY EXAM OF ANKLE: CPT | Performed by: INTERNAL MEDICINE

## 2017-09-06 PROCEDURE — 86140 C-REACTIVE PROTEIN: CPT

## 2017-09-06 PROCEDURE — 85652 RBC SED RATE AUTOMATED: CPT

## 2017-09-06 RX ORDER — DULOXETIN HYDROCHLORIDE 20 MG/1
CAPSULE, DELAYED RELEASE ORAL
Qty: 30 CAPSULE | Refills: 1 | Status: SHIPPED | OUTPATIENT
Start: 2017-09-06 | End: 2017-09-08 | Stop reason: ALTCHOICE

## 2017-09-06 RX ORDER — METHYLPREDNISOLONE 4 MG/1
TABLET ORAL
Qty: 1 PACKAGE | Refills: 0 | Status: SHIPPED | OUTPATIENT
Start: 2017-09-06 | End: 2019-01-08 | Stop reason: ALTCHOICE

## 2017-09-06 RX ORDER — HYDROCODONE BITARTRATE AND ACETAMINOPHEN 7.5; 325 MG/1; MG/1
TABLET ORAL
Qty: 120 TABLET | Refills: 0 | Status: SHIPPED | OUTPATIENT
Start: 2017-09-06 | End: 2017-11-02

## 2017-09-06 NOTE — TELEPHONE ENCOUNTER
I gave Rodolfo Andre a call with his test results from this morning.   His right ankle x-rays show mild osteoarthritis in the midfoot, and changes of his malleoli suggesting that he has had injuries in the past.  I will send a prescription in for a Medrol pack to hi

## 2017-09-06 NOTE — TELEPHONE ENCOUNTER
Action Requested: Summary for Provider     []  Critical Lab, Recommendations Needed  [] Need Additional Advice  []   FYI    []   Need Orders  [] Need Medications Sent to Pharmacy  [x]  Other     SUMMARY:   Patient asking for an early morning appointment ei

## 2017-09-06 NOTE — TELEPHONE ENCOUNTER
You have no openings this week at all. Pt doesn't want to see anyone else. Can pt be added on either 9 am or 1 pm due to pt's work schedule. Please advise.

## 2017-09-13 ENCOUNTER — TELEPHONE (OUTPATIENT)
Dept: INTERNAL MEDICINE CLINIC | Facility: CLINIC | Age: 47
End: 2017-09-13

## 2017-09-13 RX ORDER — AZITHROMYCIN 250 MG/1
TABLET, FILM COATED ORAL
Qty: 6 TABLET | Refills: 0 | Status: SHIPPED | OUTPATIENT
Start: 2017-09-13 | End: 2019-01-08 | Stop reason: ALTCHOICE

## 2017-09-13 NOTE — TELEPHONE ENCOUNTER
Actions Requested: Pt requesting Rx for Z Edgar  Problem: Pt stts has shortness of breath,dizziness,nasal congestion,sweating and coughing up brown phlegm.   Onset and Timin days  Associated Symptoms:  Pt stts has shortness of breath,dizziness,nasal con

## 2017-09-24 NOTE — PROGRESS NOTES
HPI:    Patient ID: Kristen Block is a 40year old male. HPI  Patient is here with recent multiple stressors. He has been  from his wife since Thanksgiving. That was her doing. They share 3 children.   They are currently living with mother and n Hypertension Brother       Smoking status: Former Smoker                                                              Packs/day: 0.00      Years: 10.00        Types: Cigarettes     Quit date: 7/25/2012  Smokeless tobacco: Former User sounds normal. He has no wheezes. He has no rales. Abdominal: Soft. Bowel sounds are normal. He exhibits no mass. There is no tenderness. Musculoskeletal: He exhibits no tenderness. Lymphadenopathy:     He has no cervical adenopathy.    Neurological:

## 2017-11-02 DIAGNOSIS — M87.052 AVASCULAR NECROSIS OF HIP, LEFT (HCC): ICD-10-CM

## 2017-11-02 NOTE — TELEPHONE ENCOUNTER
Per pt, he needs refill on his hydrocodone-acetaminophen . Per pt, he will come and  rx med wherever,  Pt is out of med.       Current Outpatient Prescriptions:                    hydrocodone-acetaminophen 7.5-325 MG Oral Tab Take one every 6 hours P

## 2017-11-03 RX ORDER — HYDROCODONE BITARTRATE AND ACETAMINOPHEN 7.5; 325 MG/1; MG/1
TABLET ORAL
Qty: 120 TABLET | Refills: 0 | Status: SHIPPED | OUTPATIENT
Start: 2017-11-03 | End: 2017-12-06

## 2017-11-03 NOTE — TELEPHONE ENCOUNTER
Phoned patient and informed him his printed [facility] Rx is ready for  at [facility] . Patient stated he would be in today to pick it up.

## 2017-12-05 DIAGNOSIS — M87.052 AVASCULAR NECROSIS OF HIP, LEFT (HCC): ICD-10-CM

## 2017-12-05 NOTE — TELEPHONE ENCOUNTER
Pt called in stating he will be in [facility] for the next couple months and is almost out of his medication below. Pt states he has maybe 2 weeks left of medication.  Pt states that he has also left his Vicodin in [facility], so he has no pain medications with

## 2017-12-06 ENCOUNTER — TELEPHONE (OUTPATIENT)
Dept: OTHER | Age: 47
End: 2017-12-06

## 2017-12-06 ENCOUNTER — TELEPHONE (OUTPATIENT)
Dept: RHEUMATOLOGY | Facility: CLINIC | Age: 47
End: 2017-12-06

## 2017-12-06 RX ORDER — LISINOPRIL 20 MG/1
20 TABLET ORAL
Qty: 30 TABLET | Refills: 2 | Status: SHIPPED | OUTPATIENT
Start: 2017-12-06 | End: 2018-04-04

## 2017-12-06 RX ORDER — HYDROCODONE BITARTRATE AND ACETAMINOPHEN 7.5; 325 MG/1; MG/1
TABLET ORAL
Qty: 120 TABLET | Refills: 0 | Status: SHIPPED | OUTPATIENT
Start: 2017-12-06 | End: 2019-01-08 | Stop reason: ALTCHOICE

## 2017-12-06 RX ORDER — FOLIC ACID 1 MG/1
TABLET ORAL
Qty: 180 TABLET | Refills: 3 | Status: SHIPPED | OUTPATIENT
Start: 2017-12-06 | End: 2018-09-19

## 2017-12-06 RX ORDER — METHOTREXATE 2.5 MG/1
TABLET ORAL
Qty: 40 TABLET | Refills: 5 | Status: SHIPPED | OUTPATIENT
Start: 2017-12-06 | End: 2018-09-19

## 2017-12-06 NOTE — TELEPHONE ENCOUNTER
Dr. Felton Zaldivar, see patient note below. Patient failed protocol for HTN.     LOV: 9/8/17     LR [place]: 45/9/99  LR folic acid: 8/23/99  LR Methotrexate: 12/14/16    Scripts pended. Please advise.         Hypertensive Medications  Protocol Criteria:  · Appoi

## 2018-04-04 ENCOUNTER — TELEPHONE (OUTPATIENT)
Dept: OTHER | Age: 48
End: 2018-04-04

## 2018-04-05 RX ORDER — LISINOPRIL 20 MG/1
TABLET ORAL
Qty: 30 TABLET | Refills: 1 | Status: SHIPPED | OUTPATIENT
Start: 2018-04-05 | End: 2018-09-17

## 2018-09-17 DIAGNOSIS — M87.052 AVASCULAR NECROSIS OF HIP, LEFT (HCC): ICD-10-CM

## 2018-09-17 RX ORDER — FOLIC ACID 1 MG/1
TABLET ORAL
Qty: 180 TABLET | Refills: 3 | OUTPATIENT
Start: 2018-09-17

## 2018-09-17 RX ORDER — LISINOPRIL 20 MG/1
20 TABLET ORAL
Qty: 30 TABLET | Refills: 1 | Status: SHIPPED | OUTPATIENT
Start: 2018-09-17 | End: 2019-01-08

## 2018-09-17 RX ORDER — HYDROCODONE BITARTRATE AND ACETAMINOPHEN 7.5; 325 MG/1; MG/1
TABLET ORAL
Qty: 120 TABLET | Refills: 0 | OUTPATIENT
Start: 2018-09-17

## 2018-09-17 NOTE — TELEPHONE ENCOUNTER
LOV: 9/6/2017 Please advise on refills requests. Medications last refilled 12/2017.   Future Appointments   Date Time Provider Renan Thurston   10/26/2018 11:20 AM Monika Mendoza MD [facility]     Labs:   Component      Latest Ref Rng & Units 9/6/2017   WBC      4.0 - 11.0 K/UL 6.2   RBC      4.50 - 5.90 M/UL 4.74   Hemoglobin      13.5 - 17.5 g/dL 14.1   Hematocrit      41.0 - 52.0 % 42.9   MCV      80.0 - 100.0 fL 90.5   MCH      27.0 - 32.0 pg 29.7   MCHC      32.0 - 37.0 g/dl 32.8   RDW      11.0 - 15.0 % 13.8   Platelet Count      203 - 400 K/   MEAN PLATELET VOLUME      7.4 - 10.3 fL 7.8   Neutrophils %      % 73   Lymphocytes %      % 16   Monocytes %      % 9   Eosinophils %      % 2   Basophils %      % 1   Neutrophils Absolute      1.8 - 7.7 K/UL 4.5   Lymphocytes Absolute      1.0 - 4.0 K/UL 1.0   Monocytes Absolute      0.0 - 1.0 K/UL 0.6   Eosinophils Absolute      0.0 - 0.7 K/UL 0.1   Basophils Absolute      0.0 - 0.2 K/UL 0.0   CREATININE      0.50 - 1.50 mg/dL 0.88   GFR, Non-      >=60 >60   GFR, -American      >=60 >60   Albumin      3.5 - 4.8 g/dL 3.7   AST (SGOT)      15 - 41 U/L 19   C-REACTIVE PROTEIN      0.0 - 0.9 mg/dL <0.5   SED RATE      0 - 15 mm/Hr 6

## 2018-09-19 RX ORDER — FOLIC ACID 1 MG/1
TABLET ORAL
Qty: 180 TABLET | Refills: 3 | Status: SHIPPED | OUTPATIENT
Start: 2018-09-19 | End: 2019-01-23

## 2018-09-19 NOTE — TELEPHONE ENCOUNTER
Pt is checking status of refills and states out of meds and starting to feel pain being out of meds.

## 2018-09-20 DIAGNOSIS — M87.052 AVASCULAR NECROSIS OF HIP, LEFT (HCC): ICD-10-CM

## 2018-09-20 RX ORDER — HYDROCODONE BITARTRATE AND ACETAMINOPHEN 7.5; 325 MG/1; MG/1
TABLET ORAL
Qty: 120 TABLET | Refills: 0 | OUTPATIENT
Start: 2018-09-20

## 2018-09-20 NOTE — TELEPHONE ENCOUNTER
Phoned pt and let him know Rx for Folic Acid and Methotrexate were sent to his pharmacy. Patient requesting refill on Hydrocodone as well. Pt has not had Insurance and was paying out of pocket for meds. Please advise.

## 2018-12-11 ENCOUNTER — TELEPHONE (OUTPATIENT)
Dept: INTERNAL MEDICINE CLINIC | Facility: CLINIC | Age: 48
End: 2018-12-11

## 2018-12-11 NOTE — TELEPHONE ENCOUNTER
Per pt he would like to come and see Dr Randy Collet late hours for px but only SDS appt is available. Pls advise thank you.     Please reply to pool: ROSANNA Alvarez

## 2018-12-13 NOTE — TELEPHONE ENCOUNTER
Pt is scheduled for 12/27/18 at 4:50pm for a BP medication follow up he said he doesn't necessarily need a px. Pt was put in a SDS slot per MD.       Please advise thank you.

## 2018-12-20 RX ORDER — METHOTREXATE 2.5 MG/1
TABLET ORAL
Qty: 96 TABLET | Refills: 0 | Status: SHIPPED | OUTPATIENT
Start: 2018-12-20 | End: 2019-01-23

## 2018-12-20 NOTE — TELEPHONE ENCOUNTER
Phoned patient and left a message on his voice mail to do labs and schedule a follow up with Dr. Christine Baez.

## 2018-12-20 NOTE — TELEPHONE ENCOUNTER
Requested medication: Methotrexate Sodium 2.5 MG Oral Tab    LOV: 9/6/2017 (attempted to contact pt to set up f/u appt - LVM to schedule appt at earliest convenience)   Future Appointments   Date Time Provider Renan Thurston   1/3/2019  6:00 PM Toy,

## 2018-12-20 NOTE — TELEPHONE ENCOUNTER
FAX RECEIVED AT [facility] 90 DAY REQUEST REFILL FOR THE FOLLOWING. Current Outpatient Medications:  Methotrexate Sodium 2.5 MG Oral Tab Take 8 tablets every Friday.  Disp: 40 tablet Rfl: 1

## 2018-12-27 NOTE — TELEPHONE ENCOUNTER
LOV:   Last Refilled:#  Labs: Future Appointments   Date Time Provider Renan Thurston   [facility]  [facility]       Please advise.

## 2019-01-08 ENCOUNTER — LAB ENCOUNTER (OUTPATIENT)
Dept: LAB | Facility: HOSPITAL | Age: 49
End: 2019-01-08
Attending: INTERNAL MEDICINE
Payer: COMMERCIAL

## 2019-01-08 DIAGNOSIS — M06.1 ADULT-ONSET STILL'S DISEASE (HCC): ICD-10-CM

## 2019-01-08 LAB
ALBUMIN SERPL BCP-MCNC: 3.8 G/DL (ref 3.5–4.8)
ALBUMIN/GLOB SERPL: 1.1 {RATIO} (ref 1–2)
ALP SERPL-CCNC: 63 U/L (ref 32–100)
ALT SERPL-CCNC: 30 U/L (ref 17–63)
ANION GAP SERPL CALC-SCNC: 15 MMOL/L (ref 0–18)
AST SERPL-CCNC: 43 U/L (ref 15–41)
BASOPHILS # BLD: 0.1 K/UL (ref 0–0.2)
BASOPHILS NFR BLD: 1 %
BILIRUB SERPL-MCNC: 1.3 MG/DL (ref 0.3–1.2)
BUN SERPL-MCNC: 16 MG/DL (ref 8–20)
BUN/CREAT SERPL: 19.8 (ref 10–20)
CALCIUM SERPL-MCNC: 8.7 MG/DL (ref 8.5–10.5)
CHLORIDE SERPL-SCNC: 94 MMOL/L (ref 95–110)
CO2 SERPL-SCNC: 25 MMOL/L (ref 22–32)
CREAT SERPL-MCNC: 0.81 MG/DL (ref 0.5–1.5)
EOSINOPHIL # BLD: 0.2 K/UL (ref 0–0.7)
EOSINOPHIL NFR BLD: 3 %
ERYTHROCYTE [DISTWIDTH] IN BLOOD BY AUTOMATED COUNT: 13.9 % (ref 11–15)
ERYTHROCYTE [SEDIMENTATION RATE] IN BLOOD: 6 MM/HR (ref 0–15)
GLOBULIN PLAS-MCNC: 3.6 G/DL (ref 2.5–3.7)
GLUCOSE SERPL-MCNC: 88 MG/DL (ref 70–99)
HCT VFR BLD AUTO: 44.4 % (ref 41–52)
HGB BLD-MCNC: 15 G/DL (ref 13.5–17.5)
LYMPHOCYTES # BLD: 1.7 K/UL (ref 1–4)
LYMPHOCYTES NFR BLD: 29 %
MCH RBC QN AUTO: 30 PG (ref 27–32)
MCHC RBC AUTO-ENTMCNC: 33.8 G/DL (ref 32–37)
MCV RBC AUTO: 88.8 FL (ref 80–100)
MONOCYTES # BLD: 0.6 K/UL (ref 0–1)
MONOCYTES NFR BLD: 11 %
NEUTROPHILS # BLD AUTO: 3.3 K/UL (ref 1.8–7.7)
NEUTROPHILS NFR BLD: 56 %
OSMOLALITY UR CALC.SUM OF ELEC: 279 MOSM/KG (ref 275–295)
PATIENT FASTING: NO
PLATELET # BLD AUTO: 315 K/UL (ref 140–400)
PMV BLD AUTO: 7.2 FL (ref 7.4–10.3)
POTASSIUM SERPL-SCNC: 3.8 MMOL/L (ref 3.3–5.1)
PROT SERPL-MCNC: 7.4 G/DL (ref 5.9–8.4)
RBC # BLD AUTO: 5 M/UL (ref 4.5–5.9)
SODIUM SERPL-SCNC: 134 MMOL/L (ref 136–144)
WBC # BLD AUTO: 5.9 K/UL (ref 4–11)

## 2019-01-08 PROCEDURE — 85652 RBC SED RATE AUTOMATED: CPT

## 2019-01-08 PROCEDURE — 36415 COLL VENOUS BLD VENIPUNCTURE: CPT

## 2019-01-08 PROCEDURE — 86140 C-REACTIVE PROTEIN: CPT

## 2019-01-08 PROCEDURE — 85025 COMPLETE CBC W/AUTO DIFF WBC: CPT

## 2019-01-08 PROCEDURE — 80053 COMPREHEN METABOLIC PANEL: CPT

## 2019-01-08 NOTE — PROGRESS NOTES
HPI:    Patient ID: Marilin Crawford is a 53year old male. HPI  Patient is here to reestablish care for follow-up on chronic medical problems. Last seen here in September 2017 with issues with depression. Things got worse.   Is actually an inpatient for a w 1991    hernia repair    • OTHER SURGICAL HISTORY  2011    right thigh cellulitis   • WRIST ARTHROSCOP,RELEASE XVERS LIG Left 3-3-17    endoscopic carpal tunnel release      Family History   Problem Relation Age of Onset   • Diabetes Father    • Hypertens Tab Take 1 tablet (0.5 mg total) by mouth 3 (three) times daily as needed for Sleep or Anxiety. Disp: 30 tablet Rfl: 0   Cyclobenzaprine HCl 10 MG Oral Tab Take 10 mg by mouth as needed for Muscle spasms.  Disp:  Rfl:    Multiple Vitamin (MULTI-VITAMINS) Or hypertension  Blood pressure is elevated. He has not been on his medication recently. Resume lisinopril 20 mg a day. Check blood pressure at home. Follow-up here in the coming weeks.     3. Adult-onset Still's disease [facility])  Usual rheumatology testing d

## 2019-01-23 ENCOUNTER — OFFICE VISIT (OUTPATIENT)
Dept: RHEUMATOLOGY | Facility: CLINIC | Age: 49
End: 2019-01-23
Payer: COMMERCIAL

## 2019-01-23 VITALS
SYSTOLIC BLOOD PRESSURE: 180 MMHG | HEART RATE: 90 BPM | HEIGHT: 71 IN | BODY MASS INDEX: 34.86 KG/M2 | DIASTOLIC BLOOD PRESSURE: 124 MMHG | WEIGHT: 249 LBS

## 2019-01-23 DIAGNOSIS — Z51.81 ENCOUNTER FOR THERAPEUTIC DRUG MONITORING: ICD-10-CM

## 2019-01-23 DIAGNOSIS — M06.1 ADULT-ONSET STILL'S DISEASE (HCC): Primary | ICD-10-CM

## 2019-01-23 DIAGNOSIS — M87.052 AVASCULAR NECROSIS OF BONE OF LEFT HIP (HCC): ICD-10-CM

## 2019-01-23 PROCEDURE — 99212 OFFICE O/P EST SF 10 MIN: CPT | Performed by: INTERNAL MEDICINE

## 2019-01-23 PROCEDURE — 99214 OFFICE O/P EST MOD 30 MIN: CPT | Performed by: INTERNAL MEDICINE

## 2019-01-23 RX ORDER — HYDROCODONE BITARTRATE AND ACETAMINOPHEN 7.5; 325 MG/1; MG/1
1 TABLET ORAL EVERY 8 HOURS PRN
Qty: 100 TABLET | Refills: 0 | Status: SHIPPED | OUTPATIENT
Start: 2019-01-23 | End: 2019-03-26

## 2019-01-23 RX ORDER — METHOTREXATE 2.5 MG/1
TABLET ORAL
Qty: 104 TABLET | Refills: 1 | Status: SHIPPED | OUTPATIENT
Start: 2019-01-23 | End: 2020-01-15

## 2019-01-23 RX ORDER — FOLIC ACID 1 MG/1
TABLET ORAL
Qty: 180 TABLET | Refills: 3 | Status: SHIPPED | OUTPATIENT
Start: 2019-01-23 | End: 2020-01-15

## 2019-01-23 NOTE — PROGRESS NOTES
Jeff Bunch is a 56-year-old gentleman with history of adult-onset still's disease, which presented with fevers, rash, and inflammatory arthritis, which was treated with high-dose steroids originally.      Unfortunately, I have not been able to see him since Septe lateral ankle. His right ankle hurts when he inverts the foot. Neurological: He is alert and oriented to person, place, and time. Psychiatric: He has a normal mood and affect. ASSESSMENT/PLAN:     1. Avascular necrosis of hips. Norco sparingly.   2.

## 2019-02-08 ENCOUNTER — OFFICE VISIT (OUTPATIENT)
Dept: INTERNAL MEDICINE CLINIC | Facility: CLINIC | Age: 49
End: 2019-02-08
Payer: COMMERCIAL

## 2019-02-08 VITALS
OXYGEN SATURATION: 97 % | WEIGHT: 255 LBS | SYSTOLIC BLOOD PRESSURE: 183 MMHG | DIASTOLIC BLOOD PRESSURE: 110 MMHG | HEIGHT: 71 IN | BODY MASS INDEX: 35.7 KG/M2 | TEMPERATURE: 99 F | HEART RATE: 93 BPM

## 2019-02-08 DIAGNOSIS — I10 ESSENTIAL HYPERTENSION: ICD-10-CM

## 2019-02-08 DIAGNOSIS — J06.9 UPPER RESPIRATORY TRACT INFECTION, UNSPECIFIED TYPE: Primary | ICD-10-CM

## 2019-02-08 PROCEDURE — 99212 OFFICE O/P EST SF 10 MIN: CPT | Performed by: INTERNAL MEDICINE

## 2019-02-08 PROCEDURE — 99213 OFFICE O/P EST LOW 20 MIN: CPT | Performed by: INTERNAL MEDICINE

## 2019-02-08 RX ORDER — AZITHROMYCIN 250 MG/1
TABLET, FILM COATED ORAL
Qty: 6 TABLET | Refills: 0 | Status: ON HOLD | OUTPATIENT
Start: 2019-02-08 | End: 2019-05-01

## 2019-02-08 NOTE — PROGRESS NOTES
Patient ID: Mayte Morales is a 53year old male. Patient presents with: Body ache and/or chills: Chills, cough, nausea symptoms started last Sunday. Possible fever.         HISTORY OF PRESENT ILLNESS:   HPI  4 day(s) ago  Fever - Yes, Tmax (102)  Cough - Lavell Freeman by Gabriel Ulrich MD at [facility] MAIN OR   • OTHER SURGICAL HISTORY  1991    hernia repair    • OTHER SURGICAL HISTORY  2011    right thigh cellulitis   • WRIST ARTHROSCOP,RELEASE XVERS LIG Left 3-3-17    endoscopic carpal tunnel release         Current comment: 1 pk would last a week    Substance and Sexual Activity      Alcohol use:  Yes        Alcohol/week: 0.0 oz        Comment: 1 x a week      Drug use: No      Sexual activity: Not Currently        Partners: Female    Other Topics      Concerns: infection, unspecified type  · azithromycin 250 MG Oral Tab; Take two tablets by mouth today, then one daily. Dispense: 6 tablet;  Refill: 0  · Symptomatic treatment with hot showers, steam inhalation, saltwater gargling, fluids, decongestants, cough suppr

## 2019-02-08 NOTE — PATIENT INSTRUCTIONS
1.  Take antibiotics as prescribed. 2.  Symptomatic treatment with hot showers, steam inhalation, plenty of fluids, saltwater gargling as needed. 3.  Wear a Breathe Right strip at night to help breathing. 4.  Resume taking her medications today.   5.  Joce Friends

## 2019-03-21 RX ORDER — ALPRAZOLAM 0.5 MG/1
0.5 TABLET ORAL 3 TIMES DAILY PRN
Qty: 30 TABLET | Refills: 0 | Status: ON HOLD | OUTPATIENT
Start: 2019-03-21 | End: 2019-05-03

## 2019-03-21 NOTE — TELEPHONE ENCOUNTER
Pt stts he would like a refill on RX Alprazolam 0.5MG. Pt stts he use to get this medication before. Pt stts only sleeping 2 hours a night.  Please advise

## 2019-03-21 NOTE — TELEPHONE ENCOUNTER
Controlled medication pending for review. If approved needs to be called in or faxed by on-site staff.     Last Rx: 9/8/17  LOV: 1/8/19

## 2019-03-21 NOTE — TELEPHONE ENCOUNTER
Pt stts refill Rx hydrocodone. pt stts he has very little left. Please call when ready.  Please advise           Current Outpatient Medications:                          HYDROcodone-acetaminophen 7.5-325 MG Oral Tab Take 1 tablet by mouth every 8 (eight) [place]

## 2019-03-21 NOTE — TELEPHONE ENCOUNTER
Contacted pt and informed Dr. Kei Serrano is out of office until Monday (3/25) - pt states he can wait until that time. Pt declined message being route to on-call physician. Pt can  script on Monday (3/25) evening from [facility] if approved. Please advise.      Req

## 2019-03-26 ENCOUNTER — TELEPHONE (OUTPATIENT)
Dept: RHEUMATOLOGY | Facility: CLINIC | Age: 49
End: 2019-03-26

## 2019-03-26 RX ORDER — HYDROCODONE BITARTRATE AND ACETAMINOPHEN 7.5; 325 MG/1; MG/1
1 TABLET ORAL EVERY 8 HOURS PRN
Qty: 100 TABLET | Refills: 0 | Status: ON HOLD | OUTPATIENT
Start: 2019-03-26 | End: 2019-05-03

## 2019-03-26 NOTE — TELEPHONE ENCOUNTER
Lewis Susanne has requested a refill on his Norco.  He has been working, and is having significant pain, especially in his hip, where he has avascular necrosis. A new prescription was created for 100 tablets. He cannot take any more than 3 a day.   He will pick

## 2019-05-01 ENCOUNTER — APPOINTMENT (OUTPATIENT)
Dept: ULTRASOUND IMAGING | Facility: HOSPITAL | Age: 49
DRG: 897 | End: 2019-05-01
Attending: EMERGENCY MEDICINE
Payer: COMMERCIAL

## 2019-05-01 ENCOUNTER — APPOINTMENT (OUTPATIENT)
Dept: CT IMAGING | Facility: HOSPITAL | Age: 49
DRG: 897 | End: 2019-05-01
Attending: EMERGENCY MEDICINE
Payer: COMMERCIAL

## 2019-05-01 ENCOUNTER — APPOINTMENT (OUTPATIENT)
Dept: GENERAL RADIOLOGY | Facility: HOSPITAL | Age: 49
DRG: 897 | End: 2019-05-01
Payer: COMMERCIAL

## 2019-05-01 PROBLEM — F10.930 ALCOHOL WITHDRAWAL SYNDROME WITHOUT COMPLICATION (HCC): Status: ACTIVE | Noted: 2019-05-01

## 2019-05-01 PROBLEM — R07.9 ACUTE CHEST PAIN: Status: ACTIVE | Noted: 2019-05-01

## 2019-05-01 PROBLEM — R74.01 TRANSAMINITIS: Status: ACTIVE | Noted: 2019-05-01

## 2019-05-01 PROBLEM — E83.42 HYPOMAGNESEMIA: Status: ACTIVE | Noted: 2019-05-01

## 2019-05-01 PROBLEM — F10.939 ALCOHOL WITHDRAWAL (HCC): Status: ACTIVE | Noted: 2019-05-01

## 2019-05-01 PROBLEM — F10.230 ALCOHOL WITHDRAWAL SYNDROME WITHOUT COMPLICATION (HCC): Status: ACTIVE | Noted: 2019-05-01

## 2019-05-01 PROBLEM — F10.239 ALCOHOL WITHDRAWAL (HCC): Status: ACTIVE | Noted: 2019-05-01

## 2019-05-01 PROCEDURE — 76705 ECHO EXAM OF ABDOMEN: CPT | Performed by: EMERGENCY MEDICINE

## 2019-05-01 PROCEDURE — 71046 X-RAY EXAM CHEST 2 VIEWS: CPT

## 2019-05-01 PROCEDURE — 71260 CT THORAX DX C+: CPT | Performed by: EMERGENCY MEDICINE

## 2019-05-01 NOTE — ED PROVIDER NOTES
Patient Seen in: [facility] AND [facility] Emergency Department    History   Patient presents with:  Chest Pain Angina (cardiovascular)  Eval-P (psychiatric)    Stated Complaint: chest pain    HPI    60-year-old male presents for complaint of chest pain.   She re Cigarettes        Quit date: 2012        Years since quittin.7      Smokeless tobacco: Former User        Quit date: 2012      Tobacco comment: 1 pk would last a week    Alcohol use:  Yes      Alcohol/week: 0.0 oz      Comment: 1 x a week    D Musculoskeletal: Normal range of motion. Right lower leg: Normal. He exhibits no tenderness and no edema. Left lower leg: Normal. He exhibits no tenderness and no edema. Neurological: He is alert and oriented to person, place, and time. Abnormal            Final result                 Please view results for these tests on the individual orders.    URINALYSIS WITH CULTURE REFLEX   RAINBOW DRAW BLUE   RAINBOW DRAW LAVENDER   RAINBOW DRAW LIGHT GREEN   RAINBOW DRAW GOLD     EKG    Rate, inte information from the family, and speaking with consultants, admitting doctors, nurses and medics and excludes any time spent on procedures.                   Disposition and Plan     Clinical Impression:  Alcohol withdrawal syndrome without complication (HC

## 2019-05-01 NOTE — PLAN OF CARE
Admitted from the ED with a CIWA of 7. 1mg of ativan given. Reassessment CIWA was 2. Metoprolol given for hypertension around 1830. Bed alarm on. Patient using urinal. Prophylactic mepilex applied to sacrum.  When asked if regarding suicidal ideations for a ADULT  Goal: Minimal or absence of nausea and vomiting  Description  INTERVENTIONS:  - Maintain adequate hydration with IV or PO as ordered and tolerated  - Nasogastric tube to low intermittent suction as ordered  - Evaluate effectiveness of ordered antiem

## 2019-05-01 NOTE — ED INITIAL ASSESSMENT (HPI)
Pt here with c/o chest pain, ETOH withdrawal and depression. Pt last drink was about 1400 yesterday. Pt drinks herminia. Denies SI/HI bit feels very sad.  Pt also c/o sob and tremors

## 2019-05-01 NOTE — H&P
[facility]    PATIENT'S NAME: Yohan Davenport   ATTENDING PHYSICIAN: Blanche Klein MD   PATIENT ACCOUNT#:   [id number]    LOCATION:  [place] [address]  MEDICAL RECORD #:   [id number]       YOB: 1970  ADMISSION DATE:       05/01/2019 bedside said patient was very confused and alcohol intoxicated last night. PHYSICAL EXAMINATION:    GENERAL:  Alert. Oriented to time, place, and person. Moderate distress.    VITAL SIGNS:  Temperature 99.8, pulse 124, respiratory rate 16, blood pres

## 2019-05-02 NOTE — PROGRESS NOTES
[facility]    Progress Note    Rip Iwona Patient Status:  Inpatient    12/15/1970 MRN [id number]   [facility] 2W/SW Attending Renan Soliman Day # 1 PCP Valencia Huang MD        Subjective:     Collin Waggoner transfer and counseling pt about dt's          Results:       Xr Chest Pa + Lat Chest (cpt=71046)    Result Date: 5/1/2019  CONCLUSION:  1. Normal examination. No significant change has occurred from January 13, 2016. Dictated by (CST):  Angelia Kemp

## 2019-05-02 NOTE — PLAN OF CARE
Problem: Patient/Family Goals  Goal: Patient/Family Long Term Goal  Description  Patient's Long Term Goal: Absence of alcohol use    Interventions:  - Psych intervention  -Monitor for s/s of withdrawl  - See additional Care Plan goals for specific interv and hemodynamic stability  Description  INTERVENTIONS:  - Monitor vital signs, rhythm, and trends  - Monitor for bleeding, hypotension and signs of decreased cardiac output  - Evaluate effectiveness of vasoactive medications to optimize hemodynamic stabili

## 2019-05-02 NOTE — PLAN OF CARE
Problem: DRUG ABUSE/DETOX  Goal: Will have no detox symptoms and will verbalize plan for changing drug-related behavior  Description  INTERVENTIONS:  - Administer medication as ordered  - Monitor physical status  - Provide emotional support with 1:1 inte

## 2019-05-02 NOTE — PAYOR COMM NOTE
--------------  ADMISSION REVIEW     Payor: [address] [place] [address] #:  [id number]  Authorization Number: [id number]      ED Provider Notes        Patient Seen in: [facility] Emergency Department    History   Patient presents endoscopic carpal tunnel release       Review of Systems   Constitutional: Negative. HENT: Negative. Eyes: Negative. Respiratory: Positive for cough and shortness of breath. Cardiovascular: Positive for chest pain.    Gastrointestinal: Positive ED Course     Labs Reviewed   BASIC METABOLIC PANEL (8) - Abnormal; Notable for the following components:       Result Value    Glucose 123 (*)     Sodium 132 (*)     Potassium 3.4 (*)     Chloride 96 (*)     Calcium, Total 8.1 (*)     Calculated Osmo also has hypomagnesemia. These are repleted. Transaminases consistent with alcoholic hepatitis. Ultrasound is pending. D-dimer was elevated, CT chest also pending. Patient is in acute alcohol withdrawal and is started on Ativan with CIWA protocol.   Andres epigastric discomfort, left anterior pleuritic chest discomfort, nausea, and vomiting since this morning. He has been heavily drinking, 1-3 bottles of herminia, on a daily basis. Today, his CBC showed white blood cell count 6.0, otherwise unremarkable.   So No peripheral edema, clubbing, or cyanosis. MUSCULOSKELETAL:  Otherwise unremarkable. NEUROLOGIC:  Motor and sensory intact. Cranial nerves II through XII are intact. SKIN:  Otherwise unremarkable. ASSESSMENT:    1.    Alcohol withdrawal.  2.   Abn maintain on seizure precautions. Results for Katerina Davila (MRN [id number]) as of 5/3/2019 07:42   Ref.  Range 5/2/2019 04:01 5/3/2019 06:18   [facility] Latest Ref Range: 45 - 117 U/L 165 (H) 160 (H)   Results for Katerina Res (MR L/min       05/02/19 0400 97.8 °F (36.6 °C) 83 20 169/105Abnormal  96 % None (Room air)       05/02/19 0100 — 87 25 159/111Abnormal  95 % None (Room air)       05/01/19 1900 — 136Abnormal  — 159/116Abnormal        05/01/19 1515 — 116 20 179/113Abnormal  97

## 2019-05-03 NOTE — PLAN OF CARE
Pt being transferred to room 535. Report given. Tele placed. Skin checked with Mio Jaimes. No new skin injuries noted.

## 2019-05-03 NOTE — PLAN OF CARE
Problem: Patient/Family Goals  Goal: Patient/Family Long Term Goal  Description  Patient's Long Term Goal: Absence of alcohol use    Interventions:  - Psych intervention  -Monitor for s/s of withdrawl  - See additional Care Plan goals for specific interv hemodynamic stability  - Monitor arterial and/or venous puncture sites for bleeding and/or hematoma  - Assess quality of pulses, skin color and temperature  - Assess for signs of decreased coronary artery perfusion - ex.  Angina  - Evaluate fluid balance, a

## 2019-05-03 NOTE — DISCHARGE SUMMARY
Dc summary #[id number]  > 30 min spent on [address] Discharge Diagnoses: etoh wd    Lace+ Score: 54  59-90 High Risk  29-58 Medium Risk  0-28   Low Risk. TCM Follow-Up Recommendation:  LACE > 58:  High Risk of readmission after discharge from the hosp

## 2019-05-03 NOTE — BH PROGRESS NOTE
Behavioral Health Note  CHIEF COMPLAINT  Alcohol withdrawal  REASON FOR ADMISSION  Alcohol withdrawal    PAST PSYCHIATRIC HISTORY  Ceasar Jimenez has a h/o depression.   He currently has an outpatient therapist.  He has a h/o inpatient placement at [place] [address] ASSESSMENT  The patient has a dx post-traumatic stress disorder, depressive disorder, alcohol dependence. PLAN  1. Psych consult completed.    2. Patient plans to follow-up with his current therapist.   3. Patient has referrals for outpatient psychiat

## 2019-05-03 NOTE — PLAN OF CARE
Problem: Patient/Family Goals  Goal: Patient/Family Long Term Goal  Description  Patient's Long Term Goal: Absence of alcohol use    Interventions:  - Psych intervention  -Monitor for s/s of withdrawl  - See additional Care Plan goals for specific interv Problem: CARDIOVASCULAR - ADULT  Goal: Maintains optimal cardiac output and hemodynamic stability  Description  INTERVENTIONS:  - Monitor vital signs, rhythm, and trends  - Monitor for bleeding, hypotension and signs of decreased cardiac output  - Evalua
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
## 2019-05-05 NOTE — DISCHARGE SUMMARY
[facility]    PATIENT'S NAME: Dmitriy Ayala   ATTENDING PHYSICIAN: Anand Soliman MD   PATIENT ACCOUNT#:   [id number]    LOCATION:  [address] [place] [address] RECORD #:   [id number]       YOB: 1970  ADMISSION DATE:       05/01/2019 Not tremulous, not hallucinating, not confused, and appears well in every way. He understands all my discharge instructions. LABORATORY STUDIES:  Please see chart. ASSESSMENT AND PLAN:    1. Alcohol withdrawal with severe continuous alcohol abuse. daily.    RISK FOR READMISSION:  High. TCM followup is recommended. Dictated By Anabel Pierre.  MD Jourdan  d: 05/03/2019 18:13:26  t: 05/05/2019 18:29:40  Amari Orourke [id number]  WIU/

## 2019-05-06 ENCOUNTER — TELEPHONE (OUTPATIENT)
Dept: INTERNAL MEDICINE CLINIC | Facility: CLINIC | Age: 49
End: 2019-05-06

## 2019-05-06 ENCOUNTER — APPOINTMENT (OUTPATIENT)
Dept: GENERAL RADIOLOGY | Facility: HOSPITAL | Age: 49
End: 2019-05-06
Attending: EMERGENCY MEDICINE
Payer: COMMERCIAL

## 2019-05-06 PROCEDURE — 71045 X-RAY EXAM CHEST 1 VIEW: CPT | Performed by: EMERGENCY MEDICINE

## 2019-05-06 NOTE — TELEPHONE ENCOUNTER
Pt called in stating that he has just left the [address] [place] [address] ER and would like to come in for a f/u today. Pt stated that he has been seen in the ER twice within the last two weeks or so. Mary Kango is it possible that pt can be seen today?     Please reply to pool: ROSANNA NGO

## 2019-05-06 NOTE — ED INITIAL ASSESSMENT (HPI)
Pt reports midsternal chest jose for the last several days radiating to back, states he has recently been treated for alcohol withdrawal, last drink on Tuesday. PT reports sob and anxiety as well.

## 2019-05-06 NOTE — ED NOTES
Pt is here with a c/o CP & SOB. Pt said that he was just discharged form  for detox. Pt said that he was feeling better but next day he woke up feeling bad again. Pt sts that intermittent CP continued for a week.  Pt also felt disoriented for few mornings

## 2019-05-06 NOTE — ED PROVIDER NOTES
Patient Seen in: [facility] AND [facility] Emergency Department    History   Patient presents with:  Chest Pain    Stated Complaint: chest pain    HPI    21-year-old male with medical problems listed below without known cardiac history was admitted last week for Yes      Alcohol/week: 0.0 oz      Comment: 1 x a week    Drug use: No      Review of Systems    Positive for stated complaint: chest pain  Other systems are as noted in HPI. Constitutional and vital signs reviewed.       All other systems reviewed and neg WITH PLATELET.   Procedure                               Abnormality         Status                     ---------                               -----------         ------                     CBC W/ DIFFERENTIAL[[id number]]          Abnormal            Final increased echogenicity compatible with fatty infiltration. There is decreased through transmission of the ultrasound beam which limits evaluation of a mass. Otherwise no gross mass or gross intrahepatic biliary ductal dilation.  Hepatic veins and main tammie ONLY) EM  COMPARISON: [facility], CT PF CHST ABD PELV W CONTRAS*, 5/22/2006, 18:51. [facility], XR CHEST PA + LAT CHEST (TQH=[id number]), 5/01/2019, 11:29. INDICATIONS: Chest wall pain with shortness of breath.   TECHNIQUE: Mult abdomen is notable for nonspecific low density of the hepatic parenchyma, which may represent underlying hepatic steatosis. BONES: Right convex curvature of the midthoracic spine is noted.  Multilevel degenerative changes are seen throughout the thoracic s Anxiety. Qty: 6 tablet Refills: 0    !! - Potential duplicate medications found. Please discuss with provider.

## 2019-05-06 NOTE — TELEPHONE ENCOUNTER
Unfortunately given my upcoming time away, I do not have any open slots to see the patient. Please place him with a different internal medicine physician.

## 2019-05-07 ENCOUNTER — PATIENT OUTREACH (OUTPATIENT)
Dept: CASE MANAGEMENT | Age: 49
End: 2019-05-07

## 2019-05-07 DIAGNOSIS — Z02.9 ENCOUNTERS FOR ADMINISTRATIVE PURPOSE: ICD-10-CM

## 2019-05-07 NOTE — PROGRESS NOTES
Initial Post Discharge Follow Up   Discharge Date: 5/6/19  Contact Date: 5/7/2019    Consent Verification:  Assessment Completed With: Patient  HIPAA Verified? Yes    Discharge Dx:   Alcohol withdrawal with elevated liver function tests.       General: Take 1 tablet by mouth every 8 (eight) hours as needed for Pain. Watch drowsiness no alcohol Disp: 12 tablet Rfl: 0   ARIPiprazole 2 MG Oral Tab Take 1 tablet (2 mg total) by mouth daily.  Watch drowsiness no alcohol Disp: 30 tablet Rfl: 0   multivitamin wi meds, disease concerns, Etc): No     Follow up appointments:       Your appointments     Date & Time Appointment Department [facility])    May 13, 2019  4:50 PM CDT Hospital Follow Up with Elsi Schmitt, [address], [address] [facility],[address], [address] ([address] having any questions with his medications and states that he picked them all up. Patient confirmed his HFU on 5/13/2019. [x]  Discharge Summary, Discharge medications discussed with patient,  and orders reviewed and discussed.  Any changes or updates

## 2019-05-07 NOTE — PAYOR COMM NOTE
--------------  CONTINUED STAY REVIEW    Payor: [address] #:  [id number]  Authorization Number: [id number]        Results for Blair Alegria (MRN [id number]) as of 5/7/2019 10:19   Ref.  Range 5/1/2019 11:18 5/2/2019 04:01

## 2019-05-13 ENCOUNTER — OFFICE VISIT (OUTPATIENT)
Dept: INTERNAL MEDICINE CLINIC | Facility: CLINIC | Age: 49
End: 2019-05-13
Payer: COMMERCIAL

## 2019-05-13 VITALS
RESPIRATION RATE: 20 BRPM | SYSTOLIC BLOOD PRESSURE: 156 MMHG | HEART RATE: 100 BPM | HEIGHT: 71 IN | WEIGHT: 256.5 LBS | TEMPERATURE: 100 F | BODY MASS INDEX: 35.91 KG/M2 | DIASTOLIC BLOOD PRESSURE: 98 MMHG

## 2019-05-13 DIAGNOSIS — I10 ESSENTIAL HYPERTENSION: ICD-10-CM

## 2019-05-13 DIAGNOSIS — R74.01 TRANSAMINITIS: ICD-10-CM

## 2019-05-13 DIAGNOSIS — F41.9 ANXIETY AND DEPRESSION: ICD-10-CM

## 2019-05-13 DIAGNOSIS — F32.A ANXIETY AND DEPRESSION: ICD-10-CM

## 2019-05-13 DIAGNOSIS — M06.1 ADULT-ONSET STILL'S DISEASE (HCC): ICD-10-CM

## 2019-05-13 DIAGNOSIS — E77.8 HYPOPROTEINEMIA (HCC): ICD-10-CM

## 2019-05-13 DIAGNOSIS — K76.0 FATTY LIVER: ICD-10-CM

## 2019-05-13 DIAGNOSIS — Z09 HOSPITAL DISCHARGE FOLLOW-UP: Primary | ICD-10-CM

## 2019-05-13 DIAGNOSIS — F10.230 ALCOHOL WITHDRAWAL SYNDROME WITHOUT COMPLICATION (HCC): ICD-10-CM

## 2019-05-13 DIAGNOSIS — Z28.21 REFUSED PNEUMOCOCCAL VACCINATION: ICD-10-CM

## 2019-05-13 PROBLEM — E83.42 HYPOMAGNESEMIA: Status: RESOLVED | Noted: 2019-05-01 | Resolved: 2019-05-13

## 2019-05-13 PROBLEM — R07.9 ACUTE CHEST PAIN: Status: RESOLVED | Noted: 2019-05-01 | Resolved: 2019-05-13

## 2019-05-13 PROCEDURE — 99495 TRANSJ CARE MGMT MOD F2F 14D: CPT | Performed by: INTERNAL MEDICINE

## 2019-05-13 PROCEDURE — 1111F DSCHRG MED/CURRENT MED MERGE: CPT | Performed by: INTERNAL MEDICINE

## 2019-05-13 RX ORDER — LISINOPRIL 20 MG/1
20 TABLET ORAL DAILY
Qty: 90 TABLET | Refills: 1 | Status: SHIPPED | OUTPATIENT
Start: 2019-05-13 | End: 2020-04-20

## 2019-05-13 NOTE — PROGRESS NOTES
Pardeep Becker is a 53year old male. Patient presents with:  Hospital F/U: Patient was seen at [facility] on 5/1/19l for elevated b/p and alcohol withdrawl syndrome and on 5/6/19 for anxiety.       HPI:       HPI:    Pardeep Becker is a 53year old male here today f anxious and had withdrawal symptoms during the hospitalization. During hospitalization he was given thiamine, Lexapro, aripiprazole, clonidine patch for withdrawal symptoms, alprazolam 0.5 mg as needed.   PCP Dr. Frank Peck  He has history of hypertension, st Take 1 capsule (2 mg total) by mouth nightly. Watch drowsiness and am dizziness   Thiamine HCl 100 MG Oral Tab Take 1 tablet (100 mg total) by mouth daily. cloNIDine 0.1 MG/24HR Transdermal Patch Weekly Place 1 patch onto the skin once a week.  Watch for palpitations and leg swelling. Gastrointestinal: Negative for heartburn, abdominal pain, diarrhea, constipation and blood in stool. Genitourinary: Negative for dysuria and frequency. Musculoskeletal: Negative for back pain, joint pain and neck pain. Patient got gym membership, planning to work out and lose weight. Alcohol withdrawal syndrome without complication ([address]  Has been sober since discharge. Patient refuses a meetings. Getting psychotherapy every Thursday.   Recommended hep A, hep B and pne Signed Prescriptions Disp Refills   • lisinopril 20 MG Oral Tab 90 tablet 1     Sig: Take 1 tablet (20 mg total) by mouth daily.  For high blood pressure       Imaging & Consults:  OP REFERRAL TO PSYCHIATRY  GASTRO - INTERNAL         Transitional Care M

## 2019-06-07 RX ORDER — CLONIDINE 0.1 MG/24H
PATCH, EXTENDED RELEASE TRANSDERMAL
Qty: 4 PATCH | Refills: 0 | Status: SHIPPED | OUTPATIENT
Start: 2019-06-07 | End: 2020-01-15 | Stop reason: ALTCHOICE

## 2019-06-07 RX ORDER — ALPRAZOLAM 0.5 MG/1
TABLET ORAL
Qty: 15 TABLET | Refills: 0 | Status: SHIPPED | OUTPATIENT
Start: 2019-06-07 | End: 2020-01-15

## 2019-06-08 NOTE — TELEPHONE ENCOUNTER
Please advise in regards to refill request. Thank You  Requested Prescriptions     Pending Prescriptions Disp Refills   • CLONIDINE 0.1 MG/24HR Transdermal Patch Weekly [Pharmacy Med Name: CLONIDINE 0.1MG/24H WEEKLY PATCH] 4 patch 0     Sig: PLACE 1 [facility]

## 2019-06-17 ENCOUNTER — TELEPHONE (OUTPATIENT)
Dept: RHEUMATOLOGY | Facility: CLINIC | Age: 49
End: 2019-06-17

## 2019-06-17 NOTE — TELEPHONE ENCOUNTER
LOV: 1/23/2019  Please see pt's request below and advise. No future appointments.   Labs:   Component      Latest Ref Rng & Units 5/3/2019   Glucose      70 - 99 mg/dL 82   Sodium      136 - 145 mmol/L 138   Potassium      3.5 - 5.1 mmol/L 3.9   Chloride

## 2019-06-17 NOTE — TELEPHONE ENCOUNTER
Patient requesting refill for     HYDROcodone-acetaminophen 7.5-325 MG Oral Tab Take 1 tablet by mouth every 8 (eight) hours as needed for Pain.  Watch drowsiness no alcohol Disp: 12 tablet Rfl: 0     Patient will be losing insurance soon so requesting refi

## 2019-06-18 NOTE — PROGRESS NOTES
Sabiha Ndiaye is a 56-year-old gentleman with history of adult-onset still's disease, which presented with fevers, rash, and inflammatory arthritis, which was treated with high-dose steroids originally.      Since I last saw him January 23rd of 2019, he states that in his wrists or hands. Left  strength is weak. There is swelling of his right lateral ankle. His right ankle hurts when he inverts the foot. Left hip motion is significantly limited, with marked pain.   Neurological: He is alert and oriented to per

## 2019-06-18 NOTE — TELEPHONE ENCOUNTER
Pt is aware of provider's message below. Pt's insurance runs out the end of this week. Can asking if he can be added to provider's schedule. Tomorrow at 1 pm?? Pt advised not to take MTX at this time.  Pt stated he is not drink at all and is currently [place]

## 2019-06-18 NOTE — TELEPHONE ENCOUNTER
I can't refill his [place]. He was recently admitted for alcohol withdrawal. He needs to schedule f/u appointment with me ASAP. He needs to stop his methotrexate (it doesn't mix with alcohol). Thanks.

## 2019-06-19 ENCOUNTER — LAB ENCOUNTER (OUTPATIENT)
Dept: LAB | Age: 49
End: 2019-06-19
Attending: INTERNAL MEDICINE
Payer: COMMERCIAL

## 2019-06-19 DIAGNOSIS — Z51.81 ENCOUNTER FOR THERAPEUTIC DRUG MONITORING: ICD-10-CM

## 2019-06-19 DIAGNOSIS — R74.01 TRANSAMINITIS: ICD-10-CM

## 2019-06-19 DIAGNOSIS — M06.1 ADULT-ONSET STILL'S DISEASE (HCC): ICD-10-CM

## 2019-06-19 PROCEDURE — 80053 COMPREHEN METABOLIC PANEL: CPT

## 2019-06-19 PROCEDURE — 36415 COLL VENOUS BLD VENIPUNCTURE: CPT

## 2019-06-19 PROCEDURE — 85025 COMPLETE CBC W/AUTO DIFF WBC: CPT

## 2019-06-19 PROCEDURE — 86140 C-REACTIVE PROTEIN: CPT

## 2019-06-19 PROCEDURE — 85652 RBC SED RATE AUTOMATED: CPT

## 2019-06-20 ENCOUNTER — TELEPHONE (OUTPATIENT)
Dept: RHEUMATOLOGY | Facility: CLINIC | Age: 49
End: 2019-06-20

## 2019-06-20 NOTE — TELEPHONE ENCOUNTER
Contacted pt and informed of Dr. Sabas Reyes message below. Pt verbalized understanding. No questions at this time.

## 2019-06-20 NOTE — TELEPHONE ENCOUNTER
Please let him know that his liver enzymes remain elevated, but have improved. No alcohol! His kidney function is normal.    His blood counts are normal.    With his elevated liver enzymes, methotrexate can't be started back now.      I think it's unlike

## 2019-07-08 ENCOUNTER — TELEPHONE (OUTPATIENT)
Dept: INTERNAL MEDICINE CLINIC | Facility: CLINIC | Age: 49
End: 2019-07-08

## 2019-07-08 NOTE — TELEPHONE ENCOUNTER
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 PLEASE CALL PATIENT WHEN FORM IS COMPLETED.

## 2019-07-09 ENCOUNTER — NURSE ONLY (OUTPATIENT)
Dept: INTERNAL MEDICINE CLINIC | Facility: CLINIC | Age: 49
End: 2019-07-09

## 2019-07-09 VITALS — SYSTOLIC BLOOD PRESSURE: 179 MMHG | DIASTOLIC BLOOD PRESSURE: 110 MMHG | HEART RATE: 83 BPM

## 2019-07-09 DIAGNOSIS — I10 ESSENTIAL HYPERTENSION: Primary | ICD-10-CM

## 2019-07-09 RX ORDER — AMLODIPINE BESYLATE 5 MG/1
5 TABLET ORAL DAILY
Qty: 30 TABLET | Refills: 5 | Status: SHIPPED | OUTPATIENT
Start: 2019-07-09 | End: 2020-01-15

## 2019-07-09 NOTE — TELEPHONE ENCOUNTER
Spoke to Pt, relayed dr message, very upset- stated \"I don't have INS-advised to discuss pay ment- stated I just want to work then I will have INS -appt nurse visit made

## 2019-07-09 NOTE — TELEPHONE ENCOUNTER
I spoke to the patient by phone. He states that the form is strictly in regards to treatment for his hypertension. This is in regards to the diagnosis, control, and medications that are used for his hypertension.   Recent blood pressure readings in the of

## 2019-07-09 NOTE — PROGRESS NOTES
Patient came in for nurse visit b/p check. First check was on left arm manually, 179/110 pulse 83. Rechecked again after 10 min patient b/p was the same 179/110. I asked patient if he is compliant with medications, he says yes.

## 2019-07-09 NOTE — PROGRESS NOTES
Pt still with high BP. He is taking lisinopril 20 mg a day. We should start patient on amlodipine 5 mg, #30, 1 PO qDay, 5 refills.  Return for visit with LUIGI Mcgrath in one week

## 2019-09-13 ENCOUNTER — TELEPHONE (OUTPATIENT)
Dept: INTERNAL MEDICINE CLINIC | Facility: CLINIC | Age: 49
End: 2019-09-13

## 2019-09-13 NOTE — TELEPHONE ENCOUNTER
Please contact the patient and get an update on how he is doing. He was last seen here a few months ago with a nurse visit for elevated blood pressure. He has had issues with insurance coverage so he hesitates to come in.   Nonetheless, his numbers were n

## 2019-09-13 NOTE — TELEPHONE ENCOUNTER
Spoke with patient, states that he is feeling better, asymptomatic, 3 weeks ago his BP was 134/80's, on bp medications.   States that he is feeling tired only due to work   States that since he started taking the lisinopril, his BP is better and able to low

## 2019-12-18 ENCOUNTER — TELEPHONE (OUTPATIENT)
Dept: INTERNAL MEDICINE CLINIC | Facility: CLINIC | Age: 49
End: 2019-12-18

## 2019-12-18 NOTE — TELEPHONE ENCOUNTER
Patient is requesting an appointment with Dr. Felton Zaldivar asap for a blood pressure check. Next available appointment with Dr. Felton Zaldivar is not until 01/15/2020, however, patient is requesting to be seen sooner. Please advise.

## 2019-12-19 NOTE — TELEPHONE ENCOUNTER
Talked to patient offered all the res24 availability of Dr Bianca Dickson but patient can not do it, patient will call back.

## 2020-01-15 ENCOUNTER — LAB ENCOUNTER (OUTPATIENT)
Dept: LAB | Facility: HOSPITAL | Age: 50
End: 2020-01-15
Attending: INTERNAL MEDICINE
Payer: COMMERCIAL

## 2020-01-15 ENCOUNTER — OFFICE VISIT (OUTPATIENT)
Dept: INTERNAL MEDICINE CLINIC | Facility: CLINIC | Age: 50
End: 2020-01-15
Payer: COMMERCIAL

## 2020-01-15 VITALS
SYSTOLIC BLOOD PRESSURE: 126 MMHG | RESPIRATION RATE: 20 BRPM | WEIGHT: 262 LBS | HEART RATE: 78 BPM | HEIGHT: 71 IN | DIASTOLIC BLOOD PRESSURE: 84 MMHG | TEMPERATURE: 98 F | BODY MASS INDEX: 36.68 KG/M2

## 2020-01-15 DIAGNOSIS — K76.0 FATTY LIVER: ICD-10-CM

## 2020-01-15 DIAGNOSIS — F41.9 ANXIETY AND DEPRESSION: ICD-10-CM

## 2020-01-15 DIAGNOSIS — F32.A ANXIETY AND DEPRESSION: ICD-10-CM

## 2020-01-15 DIAGNOSIS — M06.1 ADULT-ONSET STILL'S DISEASE (HCC): ICD-10-CM

## 2020-01-15 DIAGNOSIS — I10 ESSENTIAL HYPERTENSION: Primary | ICD-10-CM

## 2020-01-15 DIAGNOSIS — Z51.81 ENCOUNTER FOR THERAPEUTIC DRUG MONITORING: ICD-10-CM

## 2020-01-15 DIAGNOSIS — F10.11 HISTORY OF ALCOHOL ABUSE: ICD-10-CM

## 2020-01-15 LAB
ALBUMIN SERPL-MCNC: 4 G/DL (ref 3.4–5)
ALBUMIN/GLOB SERPL: 1 {RATIO} (ref 1–2)
ALP LIVER SERPL-CCNC: 75 U/L (ref 45–117)
ALT SERPL-CCNC: 47 U/L (ref 16–61)
ANION GAP SERPL CALC-SCNC: 7 MMOL/L (ref 0–18)
AST SERPL-CCNC: 37 U/L (ref 15–37)
BASOPHILS # BLD AUTO: 0.04 X10(3) UL (ref 0–0.2)
BASOPHILS NFR BLD AUTO: 0.6 %
BILIRUB SERPL-MCNC: 0.4 MG/DL (ref 0.1–2)
BUN BLD-MCNC: 21 MG/DL (ref 7–18)
BUN/CREAT SERPL: 25.3 (ref 10–20)
CALCIUM BLD-MCNC: 9.3 MG/DL (ref 8.5–10.1)
CHLORIDE SERPL-SCNC: 101 MMOL/L (ref 98–112)
CO2 SERPL-SCNC: 29 MMOL/L (ref 21–32)
CREAT BLD-MCNC: 0.83 MG/DL (ref 0.7–1.3)
CRP SERPL-MCNC: 0.36 MG/DL (ref ?–0.3)
DEPRECATED RDW RBC AUTO: 39.8 FL (ref 35.1–46.3)
EOSINOPHIL # BLD AUTO: 0.2 X10(3) UL (ref 0–0.7)
EOSINOPHIL NFR BLD AUTO: 3.2 %
ERYTHROCYTE [DISTWIDTH] IN BLOOD BY AUTOMATED COUNT: 11.9 % (ref 11–15)
ERYTHROCYTE [SEDIMENTATION RATE] IN BLOOD: 25 MM/HR (ref 0–15)
GLOBULIN PLAS-MCNC: 4.1 G/DL (ref 2.8–4.4)
GLUCOSE BLD-MCNC: 87 MG/DL (ref 70–99)
HCT VFR BLD AUTO: 42.6 % (ref 39–53)
HGB BLD-MCNC: 13.8 G/DL (ref 13–17.5)
IMM GRANULOCYTES # BLD AUTO: 0.02 X10(3) UL (ref 0–1)
IMM GRANULOCYTES NFR BLD: 0.3 %
LYMPHOCYTES # BLD AUTO: 1.95 X10(3) UL (ref 1–4)
LYMPHOCYTES NFR BLD AUTO: 31.6 %
M PROTEIN MFR SERPL ELPH: 8.1 G/DL (ref 6.4–8.2)
MCH RBC QN AUTO: 29.3 PG (ref 26–34)
MCHC RBC AUTO-ENTMCNC: 32.4 G/DL (ref 31–37)
MCV RBC AUTO: 90.4 FL (ref 80–100)
MONOCYTES # BLD AUTO: 0.52 X10(3) UL (ref 0.1–1)
MONOCYTES NFR BLD AUTO: 8.4 %
NEUTROPHILS # BLD AUTO: 3.45 X10 (3) UL (ref 1.5–7.7)
NEUTROPHILS # BLD AUTO: 3.45 X10(3) UL (ref 1.5–7.7)
NEUTROPHILS NFR BLD AUTO: 55.9 %
OSMOLALITY SERPL CALC.SUM OF ELEC: 286 MOSM/KG (ref 275–295)
PATIENT FASTING Y/N/NP: NO
PLATELET # BLD AUTO: 340 10(3)UL (ref 150–450)
POTASSIUM SERPL-SCNC: 3.8 MMOL/L (ref 3.5–5.1)
RBC # BLD AUTO: 4.71 X10(6)UL (ref 4.3–5.7)
SODIUM SERPL-SCNC: 137 MMOL/L (ref 136–145)
WBC # BLD AUTO: 6.2 X10(3) UL (ref 4–11)

## 2020-01-15 PROCEDURE — 80053 COMPREHEN METABOLIC PANEL: CPT | Performed by: INTERNAL MEDICINE

## 2020-01-15 PROCEDURE — 85652 RBC SED RATE AUTOMATED: CPT

## 2020-01-15 PROCEDURE — 99214 OFFICE O/P EST MOD 30 MIN: CPT | Performed by: INTERNAL MEDICINE

## 2020-01-15 PROCEDURE — 86140 C-REACTIVE PROTEIN: CPT

## 2020-01-15 PROCEDURE — 85025 COMPLETE CBC W/AUTO DIFF WBC: CPT

## 2020-01-15 PROCEDURE — 36415 COLL VENOUS BLD VENIPUNCTURE: CPT

## 2020-01-15 RX ORDER — ARIPIPRAZOLE 2 MG/1
2 TABLET ORAL DAILY
Qty: 30 TABLET | Refills: 0 | Status: CANCELLED | OUTPATIENT
Start: 2020-01-15

## 2020-01-15 RX ORDER — HYDROCHLOROTHIAZIDE 25 MG/1
25 TABLET ORAL DAILY
COMMUNITY
End: 2020-11-18

## 2020-01-15 RX ORDER — BUSPIRONE HYDROCHLORIDE 15 MG/1
15 TABLET ORAL 3 TIMES DAILY
COMMUNITY
End: 2020-01-24

## 2020-01-16 NOTE — PROGRESS NOTES
Jayden Downs    Your glucose level is normal. Continue to follow a low glucose diet. Your electrolytes are good. Your Bun 21. Please drink more water.   Your kidney function is very good- GFR-104    Willow Severs, ANP  Working with Dr. Ly Nim

## 2020-01-16 NOTE — PROGRESS NOTES
HPI:    Patient ID: Angelique Coelho is a 51year old male. HPI  Patient is here for follow-up on chronic medical issues. I last saw him in the office about a year ago on January 8. He was going through some rough times at that point.   He had issues with al stopped a lot of the medications. He is currently taking the hydrochlorothiazide 25 mg a day, lisinopril 10 mg a day, and buspirone 15 mg once a day. It was prescribed for 2 or 3 times a day.   He was sent home from [place] on the following medications bu release      Family History   Problem Relation Age of Onset   • Diabetes Father    • Hypertension Father    • Cancer Mother         cervical   • Diabetes Mother    • Hypertension Mother    • Depression Sister    • Hypertension Brother       Social History Right arm, Patient Position: Sitting, Cuff Size: large)   Pulse 78   Temp 98.4 °F (36.9 °C) (Oral)   Resp 20   Ht 5' 11\" (1.803 m)   Wt 262 lb (118.8 kg)   BMI 36.54 kg/m²      Physical Exam    Constitutional: He is obese.  He appears well-developed and we limited because of his insurance issues and work hours. 4. Fatty liver  Elevated liver function tests last summer related to fatty liver as well as particular alcohol intake and possible alcoholic hepatitis.   Check levels at this time.  - COMP METABOLIC

## 2020-01-23 NOTE — TELEPHONE ENCOUNTER
Patient called in requesting refill on medication below. He states that he never stopped taking medication (was on discontinued list). He states that his pain in his hip ha increased and is more consistent. Confirmed pharmacy on encounter.      Nagi

## 2020-01-23 NOTE — TELEPHONE ENCOUNTER
Please see below. I informed pt it would be best to address this request in office - offered appt tomorrow, pt unable to make it due to work. Pt stts pain to left hip is worsening and he is limping. Requesting hydrocodone refill to manage pain.  Please

## 2020-01-24 ENCOUNTER — OFFICE VISIT (OUTPATIENT)
Dept: RHEUMATOLOGY | Facility: CLINIC | Age: 50
End: 2020-01-24
Payer: COMMERCIAL

## 2020-01-24 VITALS
WEIGHT: 265 LBS | HEART RATE: 76 BPM | BODY MASS INDEX: 37.1 KG/M2 | SYSTOLIC BLOOD PRESSURE: 134 MMHG | HEIGHT: 71 IN | DIASTOLIC BLOOD PRESSURE: 91 MMHG

## 2020-01-24 DIAGNOSIS — M06.1 ADULT-ONSET STILL'S DISEASE (HCC): Primary | ICD-10-CM

## 2020-01-24 DIAGNOSIS — M87.052 AVASCULAR NECROSIS OF BONE OF LEFT HIP (HCC): ICD-10-CM

## 2020-01-24 PROCEDURE — 99213 OFFICE O/P EST LOW 20 MIN: CPT | Performed by: INTERNAL MEDICINE

## 2020-01-24 RX ORDER — IBUPROFEN 800 MG/1
TABLET ORAL
Qty: 90 TABLET | Refills: 3 | Status: SHIPPED | OUTPATIENT
Start: 2020-01-24 | End: 2020-11-18

## 2020-01-24 RX ORDER — IBUPROFEN 800 MG/1
TABLET ORAL
Qty: 90 TABLET | Refills: 0 | OUTPATIENT
Start: 2020-01-24

## 2020-01-24 NOTE — TELEPHONE ENCOUNTER
Pt aware provider cannot refill medication without him being seen in office. Spoke with pt and he is going to call office back after he checks with work to see if he can make appointment for today at 10:40 am [facility] with Dr. Sotero Gan.      He reports he has t

## 2020-01-24 NOTE — PROGRESS NOTES
Olivia Roland is a 42-year-old gentleman with history of adult-onset still's disease, which presented with fevers, rash, and inflammatory arthritis, which was treated with high-dose steroids originally.      Since I last saw him June 19th of 2019, he has remained of person, place, and time. Psychiatric: He has a normal mood and affect. ASSESSMENT/PLAN:     1. Avascular necrosis of hips. Polyarthralgias. He will try ibuprofen 800 mg 3 times daily with food. He will schedule back in 3 months.   2. Adult-onset sti

## 2020-01-24 NOTE — TELEPHONE ENCOUNTER
Cannot refill. For now, he can try ibuprofen 800 mg 3 times a day, as needed, for very severe pain. He needs to schedule a follow-up appointment with me as soon as possible. Thank you.

## 2020-01-25 NOTE — TELEPHONE ENCOUNTER
LR Aripiprazole  5-3-19  by Bowen Candelario MD  Rx for bupropion was listed as historical.       Review pended refill request as it does not fall under a protocol.   Requested Prescriptions     Pending Prescriptions Disp Refills   • busPIRone HCl 1

## 2020-01-26 RX ORDER — ARIPIPRAZOLE 2 MG/1
2 TABLET ORAL DAILY
Qty: 30 TABLET | Refills: 1 | Status: SHIPPED | OUTPATIENT
Start: 2020-01-26 | End: 2020-11-18

## 2020-01-26 RX ORDER — BUSPIRONE HYDROCHLORIDE 15 MG/1
15 TABLET ORAL 3 TIMES DAILY
Qty: 90 TABLET | Refills: 1 | Status: SHIPPED | OUTPATIENT
Start: 2020-01-26 | End: 2020-04-20

## 2020-04-10 ENCOUNTER — TELEPHONE (OUTPATIENT)
Dept: INTERNAL MEDICINE CLINIC | Facility: CLINIC | Age: 50
End: 2020-04-10

## 2020-04-10 ENCOUNTER — NURSE TRIAGE (OUTPATIENT)
Dept: INTERNAL MEDICINE CLINIC | Facility: CLINIC | Age: 50
End: 2020-04-10

## 2020-04-10 DIAGNOSIS — R51.9 HEADACHE DUE TO VIRAL INFECTION: Primary | ICD-10-CM

## 2020-04-10 DIAGNOSIS — B34.9 HEADACHE DUE TO VIRAL INFECTION: Primary | ICD-10-CM

## 2020-04-10 PROCEDURE — 99213 OFFICE O/P EST LOW 20 MIN: CPT | Performed by: NURSE PRACTITIONER

## 2020-04-10 RX ORDER — AZITHROMYCIN 250 MG/1
TABLET, FILM COATED ORAL
Qty: 6 TABLET | Refills: 0 | Status: SHIPPED | OUTPATIENT
Start: 2020-04-10 | End: 2020-04-15

## 2020-04-10 NOTE — TELEPHONE ENCOUNTER
Virtual Telephone Check-In    Tana Rodriguez verbally consents to a Virtual/Telephone Check-In visit on 04/10/20. Patient understands and accepts financial responsibility for any deductible, co-insurance and/or co-pays associated with this service.     Tyler

## 2020-04-10 NOTE — TELEPHONE ENCOUNTER
Action Requested: Summary for Provider     []  Critical Lab, Recommendations Needed  [x] Need Additional Advice  []   FYI    []   Need Orders  [] Need Medications Sent to Pharmacy  []  Other     SUMMARY: Patient reports intermittent headaches, \"for weeks

## 2020-04-10 NOTE — TELEPHONE ENCOUNTER
Patient calling reports forgot to tell Black Ojeda some info     Suffering from headaches for over 2 months; has long family of  Cerebral aneurisms  That usually  lead to death     Sister currently had  \" clips\" inserted     Best call back number: [phone]

## 2020-04-10 NOTE — ASSESSMENT & PLAN NOTE
A/P 46-year-old male who was exposed to someone at work who was positive Covidien 19. Reina Elam He has a headache but has had it for 3 to 4 weeks. The pain starts at the back of his neck and travels up the back of his head.   He also has sinus pain and pressure and

## 2020-04-11 NOTE — TELEPHONE ENCOUNTER
Pat Nunez    Patient called back. His headache is better. Pain 0/10. Patient thought he was having anxiety. If his headache comes back he will go to the ED. He is watching T.V. and he has no pain. Instructed on a low sodium diet.   Monitor B/P  Tylenol

## 2020-04-13 ENCOUNTER — TELEPHONE (OUTPATIENT)
Dept: RHEUMATOLOGY | Facility: CLINIC | Age: 50
End: 2020-04-13

## 2020-04-13 RX ORDER — HYDROCODONE BITARTRATE AND ACETAMINOPHEN 5; 325 MG/1; MG/1
TABLET ORAL
Qty: 30 TABLET | Refills: 0 | Status: SHIPPED | OUTPATIENT
Start: 2020-04-13 | End: 2020-11-18

## 2020-04-13 NOTE — TELEPHONE ENCOUNTER
Please see below. Pt is requesting Vicodin. He is experiencing body aches. He reports pain to all joints. His bones are \"throbbing\". Rest and Tylenol are not helping. He is not able to take ibuprofen (stts he tested positive for COVID 19). Please advise.

## 2020-04-13 NOTE — TELEPHONE ENCOUNTER
Directions for Norco provided to [facility]. Pharmacy will contact pt when medication is ready for .

## 2020-04-13 NOTE — TELEPHONE ENCOUNTER
I spoke to Renan Garcia. His son, his son-in-law, and people from work have coronavirus. His test came back positive this morning. He developed a cough yesterday. He is not short of breath. No obvious fever.     He cannot take ibuprofen, which had been worki

## 2020-04-13 NOTE — TELEPHONE ENCOUNTER
Eusebia calling for clarification HYDROcodone-acetaminophen 5-325 MG Oral Tab Sig: Take one every hours PRN severe pain.     Needs to know take one every how many hours

## 2020-04-13 NOTE — TELEPHONE ENCOUNTER
Per patient he states that he's in a lot of pain all over his body and would like to know if Dr Joao Kirby can give him his Vicodin.

## 2020-04-16 ENCOUNTER — VIRTUAL PHONE E/M (OUTPATIENT)
Dept: INTERNAL MEDICINE CLINIC | Facility: CLINIC | Age: 50
End: 2020-04-16
Payer: COMMERCIAL

## 2020-04-16 ENCOUNTER — APPOINTMENT (OUTPATIENT)
Dept: GENERAL RADIOLOGY | Facility: HOSPITAL | Age: 50
End: 2020-04-16
Attending: NURSE PRACTITIONER
Payer: COMMERCIAL

## 2020-04-16 ENCOUNTER — TELEPHONE (OUTPATIENT)
Dept: INTERNAL MEDICINE CLINIC | Facility: CLINIC | Age: 50
End: 2020-04-16

## 2020-04-16 ENCOUNTER — HOSPITAL ENCOUNTER (EMERGENCY)
Facility: HOSPITAL | Age: 50
Discharge: HOME | End: 2020-04-16
Payer: COMMERCIAL

## 2020-04-16 VITALS
SYSTOLIC BLOOD PRESSURE: 151 MMHG | TEMPERATURE: 99 F | DIASTOLIC BLOOD PRESSURE: 99 MMHG | BODY MASS INDEX: 35.6 KG/M2 | OXYGEN SATURATION: 97 % | HEART RATE: 94 BPM | RESPIRATION RATE: 24 BRPM | WEIGHT: 260 LBS | HEIGHT: 71.5 IN

## 2020-04-16 DIAGNOSIS — U07.1 COVID-19 VIRUS DETECTED: Primary | ICD-10-CM

## 2020-04-16 DIAGNOSIS — U07.1 COVID-19: Primary | ICD-10-CM

## 2020-04-16 PROCEDURE — 99212 OFFICE O/P EST SF 10 MIN: CPT | Performed by: NURSE PRACTITIONER

## 2020-04-16 PROCEDURE — 99283 EMERGENCY DEPT VISIT LOW MDM: CPT

## 2020-04-16 PROCEDURE — 71045 X-RAY EXAM CHEST 1 VIEW: CPT | Performed by: NURSE PRACTITIONER

## 2020-04-16 NOTE — ASSESSMENT & PLAN NOTE
Patient called with worsening flu symptoms. Fever, body aches, and now shortness of breath. He had a positive Covid 19 test from [facility]. He has worsening cough and SOB.     Plan  1) Patient instructed to go to the ED at [place].  2) Patient to [facility]

## 2020-04-16 NOTE — TELEPHONE ENCOUNTER
Pt states he was given a Zpack on Friday 4/10/2020, completed course of antibiotics. Pt states he was tested for Covid-19 on Monday 4/13/2020 at [facility], test was positive for Covid-19.      Pt states at the time he was tested he was advised to go to

## 2020-04-16 NOTE — ED NOTES
Pt able to dress independently and ambulates with steady gait. Discharge instructions reviewed and pt verbalized understanding.

## 2020-04-16 NOTE — ED INITIAL ASSESSMENT (HPI)
Pt states Covid + on Sunday. States cough is getting worse and increased SOB. Started a Lennox Hoang last Friday. States no improvement. States tactical fevers and headaches.

## 2020-04-16 NOTE — ED PROVIDER NOTES
Patient Seen in: [facility] AND [facility] Emergency Department      History   Patient presents with:  Dyspnea VINNY SOB    Stated Complaint: vinny +covid     HPI    51year old male, with high blood pressure, htn, anxiety, depression and migraines presents to the E week    Drug use: No             Review of Systems    Positive for stated complaint: vinny +covid   Other systems are as noted in HPI. Constitutional and vital signs reviewed. All other systems reviewed and negative except as noted above.     Physical E the patient that emergent conditions may arise and to return to the ER for new, worsening or any persistent conditions. I've explained the importance of following up with their doctor as instructed.  The patient verbalized understanding of the discharge ins

## 2020-04-16 NOTE — TELEPHONE ENCOUNTER
Virtual Telephone Check-In    Anisha Gonzáles verbally consents to a Virtual/Telephone Check-In visit on 04/16/20. Patient understands and accepts financial responsibility for any deductible, co-insurance and/or co-pays associated with this service.     Tyler

## 2020-04-16 NOTE — PROGRESS NOTES
Virtual Telephone Check-In     Aleks Reeves verbally consents to a Virtual/Telephone Check-In visit on 04/16/20.     Patient understands and accepts financial responsibility for any deductible, co-insurance and/or co-pays associated with this service.     Du Pt states he now has a cough, cough started on Monday. Pt continues to have headaches, body aches have resolved. Pt states he can't catch his breath when he is coughing and his chest hurts a lot. Cough is worse in the morning and at night.  Cough productive

## 2020-04-20 ENCOUNTER — TELEPHONE (OUTPATIENT)
Dept: INTERNAL MEDICINE CLINIC | Facility: CLINIC | Age: 50
End: 2020-04-20

## 2020-04-20 DIAGNOSIS — I10 ESSENTIAL HYPERTENSION: Primary | ICD-10-CM

## 2020-04-20 RX ORDER — BUSPIRONE HYDROCHLORIDE 15 MG/1
TABLET ORAL
Qty: 90 TABLET | Refills: 1 | Status: SHIPPED | OUTPATIENT
Start: 2020-04-20 | End: 2020-11-18

## 2020-04-20 RX ORDER — LISINOPRIL 20 MG/1
20 TABLET ORAL DAILY
Qty: 90 TABLET | Refills: 1 | Status: SHIPPED | OUTPATIENT
Start: 2020-04-20 | End: 2020-11-18

## 2020-04-20 NOTE — TELEPHONE ENCOUNTER
Refill request:      Current Outpatient Medications   Medication Sig Dispense Refill   • lisinopril 20 MG Oral Tab Take 1 tablet (20 mg total) by mouth daily.  For high blood pressure 90 tablet 1

## 2020-09-16 ENCOUNTER — TELEPHONE (OUTPATIENT)
Dept: INTERNAL MEDICINE CLINIC | Facility: CLINIC | Age: 50
End: 2020-09-16

## 2020-09-16 NOTE — TELEPHONE ENCOUNTER
Pt overdue for BP management. Needs f/u appt w/YOSSI asap.     TCB, [id number]    GEORGE - please assist with scheduling f/u appt

## 2020-09-18 NOTE — TELEPHONE ENCOUNTER
Spoke with the patient and informed him of the message below. Pt states that he just started a new job and will call back to schedule an appointment.

## 2020-11-18 ENCOUNTER — OFFICE VISIT (OUTPATIENT)
Dept: INTERNAL MEDICINE CLINIC | Facility: CLINIC | Age: 50
End: 2020-11-18
Payer: COMMERCIAL

## 2020-11-18 VITALS
DIASTOLIC BLOOD PRESSURE: 95 MMHG | RESPIRATION RATE: 16 BRPM | SYSTOLIC BLOOD PRESSURE: 148 MMHG | WEIGHT: 231 LBS | HEIGHT: 71.5 IN | HEART RATE: 66 BPM | BODY MASS INDEX: 31.63 KG/M2

## 2020-11-18 DIAGNOSIS — I10 ESSENTIAL HYPERTENSION: Primary | ICD-10-CM

## 2020-11-18 DIAGNOSIS — F41.9 ANXIETY AND DEPRESSION: ICD-10-CM

## 2020-11-18 DIAGNOSIS — F32.A ANXIETY AND DEPRESSION: ICD-10-CM

## 2020-11-18 DIAGNOSIS — M06.1 ADULT-ONSET STILL'S DISEASE (HCC): ICD-10-CM

## 2020-11-18 DIAGNOSIS — K76.0 FATTY LIVER: ICD-10-CM

## 2020-11-18 DIAGNOSIS — F10.11 HISTORY OF ALCOHOL ABUSE: ICD-10-CM

## 2020-11-18 PROCEDURE — 99072 ADDL SUPL MATRL&STAF TM PHE: CPT | Performed by: INTERNAL MEDICINE

## 2020-11-18 PROCEDURE — 3077F SYST BP >= 140 MM HG: CPT | Performed by: INTERNAL MEDICINE

## 2020-11-18 PROCEDURE — 3080F DIAST BP >= 90 MM HG: CPT | Performed by: INTERNAL MEDICINE

## 2020-11-18 PROCEDURE — 3008F BODY MASS INDEX DOCD: CPT | Performed by: INTERNAL MEDICINE

## 2020-11-18 PROCEDURE — 99214 OFFICE O/P EST MOD 30 MIN: CPT | Performed by: INTERNAL MEDICINE

## 2020-11-18 RX ORDER — LISINOPRIL 20 MG/1
20 TABLET ORAL DAILY
Qty: 90 TABLET | Refills: 1 | Status: SHIPPED | OUTPATIENT
Start: 2020-11-18 | End: 2021-02-08

## 2020-11-18 NOTE — PROGRESS NOTES
HPI:    Patient ID: Adalberto Farr is a 51year old male. HPI  Patient is here for follow-up on chronic medical problems as listed below. Last seen here in January. Was doing reasonably well at that time. He was maintaining sobriety.   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 Musculoskeletal: Positive for joint pain. Skin: Negative for rash. Neurological: Negative for weakness, numbness and headaches. Hematological: Does not bruise/bleed easily. Psychiatric/Behavioral: Negative for depressed mood.  The pat exercise. Follow-up in a few months.  - lisinopril 20 MG Oral Tab; Take 1 tablet (20 mg total) by mouth daily. For high blood pressure  Dispense: 90 tablet; Refill: 1    2. Anxiety and depression  Well-controlled.   The patient is not on any medications ri

## 2020-12-15 ENCOUNTER — TELEPHONE (OUTPATIENT)
Dept: INTERNAL MEDICINE CLINIC | Facility: CLINIC | Age: 50
End: 2020-12-15

## 2020-12-15 RX ORDER — SILDENAFIL 100 MG/1
100 TABLET, FILM COATED ORAL
Qty: 6 TABLET | Refills: 5 | Status: SHIPPED | OUTPATIENT
Start: 2020-12-15 | End: 2021-04-16

## 2020-12-15 RX ORDER — PANTOPRAZOLE SODIUM 40 MG/1
40 TABLET, DELAYED RELEASE ORAL DAILY PRN
Qty: 90 TABLET | Refills: 1 | Status: SHIPPED | OUTPATIENT
Start: 2020-12-15 | End: 2021-02-08

## 2020-12-15 NOTE — TELEPHONE ENCOUNTER
Pt states his acid reflux has returned and the pantoprazole he has taken in the past has helped. Pt asking for refill. Pt is also requesting refill of viagra. Meds pended.

## 2020-12-15 NOTE — TELEPHONE ENCOUNTER
Peter Bustillo    Patient experiencing GERD symptoms. Will refill     Pantoprazole Sodium (PROTONIX) 40 MG Oral Tab EC Take 1 tablet (40 mg total) by mouth daily as needed.  take 1 by Oral route  every day as needed   Number of times this order has been change

## 2021-02-08 ENCOUNTER — OFFICE VISIT (OUTPATIENT)
Dept: INTERNAL MEDICINE CLINIC | Facility: CLINIC | Age: 51
End: 2021-02-08
Payer: COMMERCIAL

## 2021-02-08 VITALS
HEART RATE: 66 BPM | BODY MASS INDEX: 31.22 KG/M2 | WEIGHT: 228 LBS | DIASTOLIC BLOOD PRESSURE: 80 MMHG | SYSTOLIC BLOOD PRESSURE: 124 MMHG | RESPIRATION RATE: 20 BRPM | HEIGHT: 71.5 IN

## 2021-02-08 DIAGNOSIS — F32.A ANXIETY AND DEPRESSION: ICD-10-CM

## 2021-02-08 DIAGNOSIS — L72.3 SEBACEOUS CYST: ICD-10-CM

## 2021-02-08 DIAGNOSIS — M06.1 ADULT-ONSET STILL'S DISEASE (HCC): ICD-10-CM

## 2021-02-08 DIAGNOSIS — Z00.00 ROUTINE PHYSICAL EXAMINATION: Primary | ICD-10-CM

## 2021-02-08 DIAGNOSIS — K76.0 FATTY LIVER: ICD-10-CM

## 2021-02-08 DIAGNOSIS — F41.9 ANXIETY AND DEPRESSION: ICD-10-CM

## 2021-02-08 DIAGNOSIS — I10 ESSENTIAL HYPERTENSION: ICD-10-CM

## 2021-02-08 DIAGNOSIS — Z86.16 HISTORY OF 2019 NOVEL CORONAVIRUS DISEASE (COVID-19): ICD-10-CM

## 2021-02-08 DIAGNOSIS — F10.11 HISTORY OF ALCOHOL ABUSE: ICD-10-CM

## 2021-02-08 PROBLEM — U07.1 COVID-19 VIRUS DETECTED: Status: RESOLVED | Noted: 2020-04-16 | Resolved: 2021-02-08

## 2021-02-08 PROBLEM — R51.9 HEADACHE DUE TO VIRAL INFECTION: Status: RESOLVED | Noted: 2020-04-10 | Resolved: 2021-02-08

## 2021-02-08 PROBLEM — B34.9 HEADACHE DUE TO VIRAL INFECTION: Status: RESOLVED | Noted: 2020-04-10 | Resolved: 2021-02-08

## 2021-02-08 PROCEDURE — 3008F BODY MASS INDEX DOCD: CPT | Performed by: INTERNAL MEDICINE

## 2021-02-08 PROCEDURE — 3074F SYST BP LT 130 MM HG: CPT | Performed by: INTERNAL MEDICINE

## 2021-02-08 PROCEDURE — 3079F DIAST BP 80-89 MM HG: CPT | Performed by: INTERNAL MEDICINE

## 2021-02-08 PROCEDURE — 99396 PREV VISIT EST AGE 40-64: CPT | Performed by: INTERNAL MEDICINE

## 2021-02-08 RX ORDER — LISINOPRIL 20 MG/1
20 TABLET ORAL DAILY
Qty: 90 TABLET | Refills: 1 | Status: SHIPPED | OUTPATIENT
Start: 2021-02-08 | End: 2021-12-29

## 2021-02-08 RX ORDER — PANTOPRAZOLE SODIUM 40 MG/1
40 TABLET, DELAYED RELEASE ORAL DAILY PRN
Qty: 90 TABLET | Refills: 1 | Status: SHIPPED | OUTPATIENT
Start: 2021-02-08 | End: 2021-09-10

## 2021-02-08 NOTE — PROGRESS NOTES
HPI:    Patient ID: Cuate Briscoe is a 53year old male. HPI  Patient is here requesting a physical exam and follow-up on chronic medical problems as listed below. Last seen in the office in November. At that time weight was down 30 pounds.   He had stopp (methicillin resistant Staphylococcus aureus) 2011    MRSA groin abcess - hospitalized; IV antibiotics; debridement   • Pneumonia 2010    left lower lobe - out pt.  antibiotics   • PONV (postoperative nausea and vomiting)    • Rheumatoid arthritis ([address] and headaches. Hematological: Does not bruise/bleed easily. Psychiatric/Behavioral: Negative for depressed mood. The patient is not nervous/anxious.              Current Outpatient Medications   Medication Sig Dispense Refill   • Pantoprazole Sodium (CO present. Left: No inguinal adenopathy present. Neurological: He is alert. No cranial nerve deficit. Coordination normal.   Skin: Skin is warm and dry. No rash noted. Psychiatric: He has a normal mood and affect.  His behavior is normal. Judgment Take 1 tablet (20 mg total) by mouth daily. For high blood pressure   • Pantoprazole Sodium (PROTONIX) 40 MG Oral Tab EC 90 tablet 1     Sig: Take 1 tablet (40 mg total) by mouth daily as needed.  take 1 by Oral route  every day as needed       Imaging & Re

## 2021-02-12 ENCOUNTER — LAB ENCOUNTER (OUTPATIENT)
Dept: LAB | Age: 51
End: 2021-02-12
Attending: INTERNAL MEDICINE
Payer: COMMERCIAL

## 2021-02-12 LAB
ALBUMIN SERPL-MCNC: 3.8 G/DL (ref 3.4–5)
ALBUMIN/GLOB SERPL: 1.1 {RATIO} (ref 1–2)
ALP LIVER SERPL-CCNC: 62 U/L
ALT SERPL-CCNC: 73 U/L
ANION GAP SERPL CALC-SCNC: 4 MMOL/L (ref 0–18)
AST SERPL-CCNC: 47 U/L (ref 15–37)
BASOPHILS # BLD AUTO: 0.03 X10(3) UL (ref 0–0.2)
BASOPHILS NFR BLD AUTO: 0.7 %
BILIRUB SERPL-MCNC: 0.5 MG/DL (ref 0.1–2)
BUN BLD-MCNC: 25 MG/DL (ref 7–18)
BUN/CREAT SERPL: 32.5 (ref 10–20)
CALCIUM BLD-MCNC: 9.3 MG/DL (ref 8.5–10.1)
CHLORIDE SERPL-SCNC: 105 MMOL/L (ref 98–112)
CHOLEST SMN-MCNC: 169 MG/DL (ref ?–200)
CO2 SERPL-SCNC: 30 MMOL/L (ref 21–32)
CREAT BLD-MCNC: 0.77 MG/DL
CRP SERPL-MCNC: <0.29 MG/DL (ref ?–0.3)
DEPRECATED RDW RBC AUTO: 41.1 FL (ref 35.1–46.3)
EOSINOPHIL # BLD AUTO: 0.12 X10(3) UL (ref 0–0.7)
EOSINOPHIL NFR BLD AUTO: 2.9 %
ERYTHROCYTE [DISTWIDTH] IN BLOOD BY AUTOMATED COUNT: 12.5 % (ref 11–15)
ERYTHROCYTE [SEDIMENTATION RATE] IN BLOOD: 12 MM/HR
GLOBULIN PLAS-MCNC: 3.5 G/DL (ref 2.8–4.4)
GLUCOSE BLD-MCNC: 90 MG/DL (ref 70–99)
HCT VFR BLD AUTO: 40.7 %
HDLC SERPL-MCNC: 56 MG/DL (ref 40–59)
HGB BLD-MCNC: 12.9 G/DL
IMM GRANULOCYTES # BLD AUTO: 0.01 X10(3) UL (ref 0–1)
IMM GRANULOCYTES NFR BLD: 0.2 %
LDLC SERPL CALC-MCNC: 103 MG/DL (ref ?–100)
LYMPHOCYTES # BLD AUTO: 1.18 X10(3) UL (ref 1–4)
LYMPHOCYTES NFR BLD AUTO: 28.5 %
M PROTEIN MFR SERPL ELPH: 7.3 G/DL (ref 6.4–8.2)
MCH RBC QN AUTO: 28.4 PG (ref 26–34)
MCHC RBC AUTO-ENTMCNC: 31.7 G/DL (ref 31–37)
MCV RBC AUTO: 89.5 FL
MONOCYTES # BLD AUTO: 0.37 X10(3) UL (ref 0.1–1)
MONOCYTES NFR BLD AUTO: 8.9 %
NEUTROPHILS # BLD AUTO: 2.43 X10 (3) UL (ref 1.5–7.7)
NEUTROPHILS # BLD AUTO: 2.43 X10(3) UL (ref 1.5–7.7)
NEUTROPHILS NFR BLD AUTO: 58.8 %
NONHDLC SERPL-MCNC: 113 MG/DL (ref ?–130)
OSMOLALITY SERPL CALC.SUM OF ELEC: 292 MOSM/KG (ref 275–295)
PATIENT FASTING Y/N/NP: YES
PATIENT FASTING Y/N/NP: YES
PLATELET # BLD AUTO: 300 10(3)UL (ref 150–450)
POTASSIUM SERPL-SCNC: 4.6 MMOL/L (ref 3.5–5.1)
RBC # BLD AUTO: 4.55 X10(6)UL
SODIUM SERPL-SCNC: 139 MMOL/L (ref 136–145)
TRIGL SERPL-MCNC: 49 MG/DL (ref 30–149)
TSI SER-ACNC: 0.69 MIU/ML (ref 0.36–3.74)
VIT B12 SERPL-MCNC: 581 PG/ML (ref 193–986)
VLDLC SERPL CALC-MCNC: 10 MG/DL (ref 0–30)
WBC # BLD AUTO: 4.1 X10(3) UL (ref 4–11)

## 2021-02-12 PROCEDURE — 80061 LIPID PANEL: CPT | Performed by: INTERNAL MEDICINE

## 2021-02-12 PROCEDURE — 84443 ASSAY THYROID STIM HORMONE: CPT | Performed by: INTERNAL MEDICINE

## 2021-02-12 PROCEDURE — 82607 VITAMIN B-12: CPT | Performed by: INTERNAL MEDICINE

## 2021-02-12 PROCEDURE — 85025 COMPLETE CBC W/AUTO DIFF WBC: CPT | Performed by: INTERNAL MEDICINE

## 2021-02-12 PROCEDURE — 80053 COMPREHEN METABOLIC PANEL: CPT | Performed by: INTERNAL MEDICINE

## 2021-02-12 PROCEDURE — 85652 RBC SED RATE AUTOMATED: CPT | Performed by: INTERNAL MEDICINE

## 2021-02-12 PROCEDURE — 86140 C-REACTIVE PROTEIN: CPT | Performed by: INTERNAL MEDICINE

## 2021-02-12 PROCEDURE — 36415 COLL VENOUS BLD VENIPUNCTURE: CPT | Performed by: INTERNAL MEDICINE

## 2021-02-15 NOTE — PROGRESS NOTES
Please send letter. Your liver enzymes are slightly elevated. These need to be repeated in one month. Do not take any tylenol. Do not drink any alcohol  Try to hydrate with water  Repeat this lab in 1 month.     The blood cell count is normal. There is

## 2021-04-16 RX ORDER — SILDENAFIL 100 MG/1
100 TABLET, FILM COATED ORAL
Qty: 18 TABLET | Refills: 1 | Status: SHIPPED | OUTPATIENT
Start: 2021-04-16

## 2021-04-16 NOTE — TELEPHONE ENCOUNTER
Dr. Nguyen Hardin, patient is requesting a 90 day supply. Please advise. Patient will be using a fypio coupon.

## 2021-05-28 ENCOUNTER — HOSPITAL ENCOUNTER (OUTPATIENT)
Dept: GENERAL RADIOLOGY | Age: 51
Discharge: HOME | End: 2021-05-28
Attending: FAMILY MEDICINE
Payer: COMMERCIAL

## 2021-05-28 DIAGNOSIS — R31.9 HEMATURIA, UNSPECIFIED TYPE: ICD-10-CM

## 2021-05-28 DIAGNOSIS — M54.50 LOW BACK PAIN: ICD-10-CM

## 2021-05-28 PROCEDURE — 72100 X-RAY EXAM L-S SPINE 2/3 VWS: CPT | Performed by: FAMILY MEDICINE

## 2021-05-28 PROCEDURE — 74018 RADEX ABDOMEN 1 VIEW: CPT | Performed by: FAMILY MEDICINE

## 2021-09-10 ENCOUNTER — TELEPHONE (OUTPATIENT)
Dept: INTERNAL MEDICINE CLINIC | Facility: CLINIC | Age: 51
End: 2021-09-10

## 2021-09-10 RX ORDER — PANTOPRAZOLE SODIUM 40 MG/1
40 TABLET, DELAYED RELEASE ORAL DAILY PRN
Qty: 90 TABLET | Refills: 1 | Status: SHIPPED | OUTPATIENT
Start: 2021-09-10 | End: 2022-02-28

## 2021-09-10 NOTE — TELEPHONE ENCOUNTER
Refill passed per [facility] protocol.      Requested Prescriptions   Pending Prescriptions Disp Refills    PANTOPRAZOLE 40 MG Oral Tab EC [Pharmacy Med Name: PANTOPRAZOLE 40MG TABLETS] 90 tablet 1     Sig: TAKE 1 TABLET(40 MG) BY MOUTH EVERY DAY AS NEEDED        Gastrointestional Medication Protocol Passed - 9/10/2021 11:38 AM        Passed - Appointment in past 12 or next 3 months                    Recent Outpatient Visits              7 months ago Routine physical examination    Brad Estes MD    Office Visit    9 months ago Essential hypertension    [place] [facility], [address], Sushila Hyatt MD    Office Visit    1 year ago COVID-19 virus detected    [place] [facility] [place], [facility], [address], Lilia Garcia APRN    Virtual Phone E/M    1 year ago Adult-onset Still's disease [facility])    [facility] for Blanco Turner MD    Office Visit    1 year ago Essential hypertension    [facility], Sushila Yanez MD    Office Visit

## 2021-10-25 ENCOUNTER — NURSE TRIAGE (OUTPATIENT)
Dept: INTERNAL MEDICINE CLINIC | Facility: CLINIC | Age: 51
End: 2021-10-25

## 2021-10-25 ENCOUNTER — OFFICE VISIT (OUTPATIENT)
Dept: INTERNAL MEDICINE CLINIC | Facility: CLINIC | Age: 51
End: 2021-10-25
Payer: COMMERCIAL

## 2021-10-25 VITALS
WEIGHT: 241.13 LBS | DIASTOLIC BLOOD PRESSURE: 73 MMHG | HEART RATE: 76 BPM | BODY MASS INDEX: 33.02 KG/M2 | SYSTOLIC BLOOD PRESSURE: 133 MMHG | HEIGHT: 71.5 IN

## 2021-10-25 DIAGNOSIS — H92.13 EAR DISCHARGE OF BOTH EARS: Primary | ICD-10-CM

## 2021-10-25 PROCEDURE — 3078F DIAST BP <80 MM HG: CPT | Performed by: INTERNAL MEDICINE

## 2021-10-25 PROCEDURE — 3008F BODY MASS INDEX DOCD: CPT | Performed by: INTERNAL MEDICINE

## 2021-10-25 PROCEDURE — 99213 OFFICE O/P EST LOW 20 MIN: CPT | Performed by: INTERNAL MEDICINE

## 2021-10-25 PROCEDURE — 3075F SYST BP GE 130 - 139MM HG: CPT | Performed by: INTERNAL MEDICINE

## 2021-10-25 NOTE — PROGRESS NOTES
Emir Kulkarni is a 53year old male. Patient presents with:  Ear Problem    HPI:   About 3 weeks ago he noticed occasional watery discharge from his left ear.   He now notices watery discharge from his right ear, and he has some pain and ringing in his right e comment: 1 pk would last a week    Vaping Use      Vaping Use: Some days        Last attempt to quit: 4/9/2020        Substances: Nicotine        Devices: Refillable tank    Alcohol use: Not Currently      Alcohol/week: 0.0 standard drinks      Comment: 1

## 2021-10-25 NOTE — TELEPHONE ENCOUNTER
Action Requested: Summary for Provider     []  Critical Lab, Recommendations Needed  [] Need Additional Advice  []   FYI    []   Need Orders  [] Need Medications Sent to Pharmacy  []  Other     SUMMARY:appt made, x 3 weeks, clear drainage left ear, disco

## 2021-11-29 ENCOUNTER — TELEPHONE (OUTPATIENT)
Dept: INTERNAL MEDICINE CLINIC | Facility: CLINIC | Age: 51
End: 2021-11-29

## 2021-11-29 NOTE — TELEPHONE ENCOUNTER
The patient was transferred to ENT    The patient stated for 3 weeks has been having ear problems.   Was seen on 10/25/21 and was to call if not getting any better  Per Dr. Oriana Varghese notes to   Recommend he call if symptoms persist at which time he would be ref

## 2021-12-06 ENCOUNTER — TELEPHONE (OUTPATIENT)
Dept: OTOLARYNGOLOGY | Facility: CLINIC | Age: 51
End: 2021-12-06

## 2021-12-14 ENCOUNTER — OFFICE VISIT (OUTPATIENT)
Dept: OTOLARYNGOLOGY | Facility: CLINIC | Age: 51
End: 2021-12-14
Payer: COMMERCIAL

## 2021-12-14 VITALS — HEIGHT: 71.5 IN | BODY MASS INDEX: 33 KG/M2 | WEIGHT: 241 LBS

## 2021-12-14 DIAGNOSIS — H60.543 ACUTE ECZEMATOID OTITIS EXTERNA, BILATERAL: Primary | ICD-10-CM

## 2021-12-14 DIAGNOSIS — H61.23 CERUMEN DEBRIS ON TYMPANIC MEMBRANE OF BOTH EARS: ICD-10-CM

## 2021-12-14 PROCEDURE — 3008F BODY MASS INDEX DOCD: CPT | Performed by: SPECIALIST

## 2021-12-14 PROCEDURE — 99203 OFFICE O/P NEW LOW 30 MIN: CPT | Performed by: SPECIALIST

## 2021-12-14 RX ORDER — ACETIC ACID 20.65 MG/ML
4 SOLUTION AURICULAR (OTIC) NIGHTLY PRN
Qty: 15 ML | Refills: 2 | Status: SHIPPED | OUTPATIENT
Start: 2021-12-14

## 2021-12-14 NOTE — PATIENT INSTRUCTIONS
You had cerumen and irritated skin in both your ears. These were fully cleaned. I placed you on a trial of VoSoL at bedtime as needed. No further cotton swabs. Follow-up in 4 to 6 months time, sooner if problems.

## 2021-12-14 NOTE — PROGRESS NOTES
Trae Regalado is a 53year old male. Patient presents with:  Ear Wax: pt presents today for both ear cleaning, and right ear pain due to wax w/ drainage.     HPI:   Difficulty hearing ears are itchy and cerumen deep in canals    Current Outpatient Medications Drug use: No       REVIEW OF SYSTEMS:   GENERAL HEALTH: feels well otherwise  GENERAL : denies fever, chills, sweats, weight loss, weight gain  SKIN: denies any unusual skin lesions or rashes  RESPIRATORY: denies shortness of breath with exertion  NEURO: d

## 2021-12-29 RX ORDER — LISINOPRIL 20 MG/1
TABLET ORAL
Qty: 90 TABLET | Refills: 1 | Status: SHIPPED | OUTPATIENT
Start: 2021-12-29

## 2022-02-28 RX ORDER — PANTOPRAZOLE SODIUM 40 MG/1
40 TABLET, DELAYED RELEASE ORAL
Qty: 90 TABLET | Refills: 1 | Status: SHIPPED | OUTPATIENT
Start: 2022-02-28

## 2022-02-28 NOTE — TELEPHONE ENCOUNTER
Refill passed per [place] [facility] protocol.   Requested Prescriptions   Pending Prescriptions Disp Refills    PANTOPRAZOLE 40 MG Oral Tab EC [Pharmacy Med Name: PANTOPRAZOLE 40MG TABLETS] 90 tablet 1     Sig: TAKE 1 TABLET(40 MG) BY MOUTH DAILY AS NEEDED        Gastrointestional Medication Protocol Passed - 2/27/2022 12:42 PM        Passed - Appointment in past 12 or next 3 months            Recent Outpatient Visits              2 months ago Acute eczematoid otitis externa, bilateral    [facility], [address], Param Schuster MD    Office Visit    4 months ago Ear discharge of both [facility], [address], Stephane Murphy MD    Office Visit    1 year ago Routine physical examination    [facility], [address], Harjit Hyatt MD    Office Visit    1 year ago Essential hypertension    [facility], [address], Harjit Hyatt MD    Office Visit    1 year ago COVID-19 virus detected    [facility], [address], Lilia Garcia APRN    Virtual Phone E/M          Future Appointments         Provider Department Appt Notes    In 1 month Josephine Henderson MD [facility], [address] Month F/u

## 2022-04-25 ENCOUNTER — LAB ENCOUNTER (OUTPATIENT)
Dept: LAB | Facility: HOSPITAL | Age: 52
End: 2022-04-25
Attending: INTERNAL MEDICINE
Payer: COMMERCIAL

## 2022-04-25 ENCOUNTER — OFFICE VISIT (OUTPATIENT)
Dept: INTERNAL MEDICINE CLINIC | Facility: CLINIC | Age: 52
End: 2022-04-25
Payer: COMMERCIAL

## 2022-04-25 VITALS
HEART RATE: 104 BPM | RESPIRATION RATE: 20 BRPM | WEIGHT: 240 LBS | SYSTOLIC BLOOD PRESSURE: 130 MMHG | BODY MASS INDEX: 32.86 KG/M2 | HEIGHT: 71.5 IN | DIASTOLIC BLOOD PRESSURE: 80 MMHG | TEMPERATURE: 97 F

## 2022-04-25 DIAGNOSIS — Z01.818 PRE-OP EXAM: Primary | ICD-10-CM

## 2022-04-25 DIAGNOSIS — F41.9 ANXIETY AND DEPRESSION: ICD-10-CM

## 2022-04-25 DIAGNOSIS — I10 ESSENTIAL HYPERTENSION: ICD-10-CM

## 2022-04-25 DIAGNOSIS — M25.552 LEFT HIP PAIN: ICD-10-CM

## 2022-04-25 DIAGNOSIS — F32.A ANXIETY AND DEPRESSION: ICD-10-CM

## 2022-04-25 PROCEDURE — 93010 ELECTROCARDIOGRAM REPORT: CPT | Performed by: INTERNAL MEDICINE

## 2022-04-25 PROCEDURE — 3075F SYST BP GE 130 - 139MM HG: CPT | Performed by: INTERNAL MEDICINE

## 2022-04-25 PROCEDURE — 93005 ELECTROCARDIOGRAM TRACING: CPT

## 2022-04-25 PROCEDURE — 3079F DIAST BP 80-89 MM HG: CPT | Performed by: INTERNAL MEDICINE

## 2022-04-25 PROCEDURE — 3008F BODY MASS INDEX DOCD: CPT | Performed by: INTERNAL MEDICINE

## 2022-04-25 PROCEDURE — 99214 OFFICE O/P EST MOD 30 MIN: CPT | Performed by: INTERNAL MEDICINE

## 2022-04-25 RX ORDER — IBUPROFEN 600 MG/1
1 TABLET ORAL 3 TIMES DAILY
COMMUNITY
Start: 2021-05-27

## 2022-04-25 RX ORDER — IBUPROFEN 800 MG/1
800 TABLET ORAL DAILY PRN
COMMUNITY
Start: 2022-01-06

## 2022-04-25 RX ORDER — LISINOPRIL 10 MG/1
10 TABLET ORAL DAILY
COMMUNITY
Start: 2022-01-31 | End: 2022-04-25

## 2022-04-25 RX ORDER — CELECOXIB 200 MG/1
200 CAPSULE ORAL 2 TIMES DAILY PRN
COMMUNITY
Start: 2022-04-04

## 2022-04-27 ENCOUNTER — TELEPHONE (OUTPATIENT)
Dept: INTERNAL MEDICINE CLINIC | Facility: CLINIC | Age: 52
End: 2022-04-27

## 2022-04-27 NOTE — TELEPHONE ENCOUNTER
Per Pao? PET, patient has surgery on 05/02/2022 and need to fax to them the medical clearance, EKG with Tracing to [phone] as soon as possible so that they can prepare it for patient surgery.

## 2022-04-28 PROBLEM — F10.239 ALCOHOL WITHDRAWAL (HCC): Status: RESOLVED | Noted: 2019-05-01 | Resolved: 2022-04-28

## 2022-04-28 PROBLEM — F10.939 ALCOHOL WITHDRAWAL (HCC): Status: RESOLVED | Noted: 2019-05-01 | Resolved: 2022-04-28

## 2022-04-28 PROBLEM — R74.01 TRANSAMINITIS: Status: RESOLVED | Noted: 2019-05-01 | Resolved: 2022-04-28

## 2022-04-28 PROBLEM — F10.230 ALCOHOL WITHDRAWAL SYNDROME WITHOUT COMPLICATION (HCC): Status: RESOLVED | Noted: 2019-05-01 | Resolved: 2022-04-28

## 2022-04-28 PROBLEM — F10.930 ALCOHOL WITHDRAWAL SYNDROME WITHOUT COMPLICATION (HCC): Status: RESOLVED | Noted: 2019-05-01 | Resolved: 2022-04-28

## 2022-04-28 NOTE — TELEPHONE ENCOUNTER
Note has been completed. I placed the paperwork from the surgeon at the nurses station. Please fax or forward any information requested.

## 2022-04-28 NOTE — TELEPHONE ENCOUNTER
Caro is calling to follow up on status of information requested.      Medical clearance and EKG      Please advise at call back #[phone]

## 2022-04-28 NOTE — TELEPHONE ENCOUNTER
Caro from [facility], Dr. Lianne Martins is calling to follow up status of pre-op clearance information requested. Patient scheduled for surgery on 5/2/22 and requesting information by 4/28/22   Please advise. Please FAX  To     Any questions, please call back [phone].

## 2022-04-28 NOTE — TELEPHONE ENCOUNTER
[facility] [address] [facility] [phone] fax# [phone]. They need patient's preop clearance paperwork faxed to them stating patient cleared for surgery and bloodwork and ekg done 4-25. Surgery is scheduled May 2. Need information today or tomorrow.

## 2022-10-02 RX ORDER — PANTOPRAZOLE SODIUM 40 MG/1
TABLET, DELAYED RELEASE ORAL
Qty: 90 TABLET | Refills: 1 | Status: SHIPPED | OUTPATIENT
Start: 2022-10-02

## 2022-10-02 NOTE — TELEPHONE ENCOUNTER
Refill passed per [address] protocol. Requested Prescriptions   Pending Prescriptions Disp Refills    LISINOPRIL 20 MG Oral Tab [Pharmacy Med Name: LISINOPRIL 20MG TABLETS] 90 tablet 1     Sig: TAKE 1 TABLET(20 MG) BY MOUTH DAILY FOR HIGH BLOOD PRESSURE        Hypertensive Medications Protocol Failed - 9/30/2022  1:49 PM        Failed - CMP or BMP in past 6 months     No results found for this or any previous visit (from the past 4392 hour(s)).               Failed - EGFRCR or GFRNAA > 50     GFR Evaluation              Passed - In person appointment in the past 12 or next 3 months       Recent Outpatient Visits              5 months ago Pre-op exam    Moises Christine MD    Office Visit    9 months ago Acute eczematoid otitis externa, bilateral    [address], [address], Tatianna Bhatia MD    Office Visit    11 months ago Ear discharge of both Amarilys [place], [address], Azalea Eldridge MD    Office Visit    1 year ago Routine physical examination    [address], [address], Chalo Hyatt MD    Office Visit    1 year ago Essential hypertension    [address], [address], Chalo Hyatt MD    Office Visit                 Passed - Last BP reading less than 140/90     BP Readings from Last 1 Encounters:  04/25/22 : 130/80                Passed - In person appointment or virtual visit in the past 6 months       Recent Outpatient Visits              5 months ago Pre-op exam    [address], [address], Moises Vazquez MD    Office Visit    9 months ago Acute eczematoid otitis externa, bilateral    [address], Tatianna Bhatia MD    Office Visit    11 months ago Ear discharge of both Amarilys Wojciech, [address], Azalea Eldridge MD    Office Visit    1 year ago Routine physical examination    Moises Christine MD Office Visit    1 year ago Essential hypertension    [address] [place], [address], James Hyatt MD    Office Visit                    PANTOPRAZOLE 40 MG Oral Tab EC [Pharmacy Med Name: PANTOPRAZOLE 40MG TABLETS] 90 tablet 1     Sig: TAKE 1 TABLET(40 MG) BY MOUTH DAILY AS NEEDED        Gastrointestional Medication Protocol Passed - 9/30/2022  1:49 PM        Passed - In person appointment or virtual visit in the past 12 mos or appointment in next 3 mos       Recent Outpatient Visits              5 months ago Pre-op exam    [facility], James Hyatt MD    Office Visit    9 months ago Acute eczematoid otitis externa, bilateral    [address], [place], Rosa Elean Owens MD    Office Visit    11 months ago Ear discharge of both Bryson Dave, [address], Lindsey Nash MD    Office Visit    1 year ago Routine physical examination    [address] [place], [address], James Hyatt MD    Office Visit    1 year ago Essential hypertension    [address] [place], [address], Celso Reyes MD    Office Visit                     Recent Outpatient Visits              5 months ago Pre-op exam    [address] [place], [address], Celso Reyes MD    Office Visit    9 months ago Acute eczematoid otitis externa, bilateral    Jacques[facility] Multanie, Rosa Elena Owens MD    Office Visit    11 months ago Ear discharge of both Bryson Tenzin, [address], Lindsey Nash MD    Office Visit    1 year ago Routine physical examination    [address] [place], [address], James Hyatt MD    Office Visit    1 year ago Essential hypertension    [facility], James Hyatt MD    Office Visit

## 2022-10-02 NOTE — TELEPHONE ENCOUNTER
Please Review. Protocol Failed or has no protocol. Requested Prescriptions   Pending Prescriptions Disp Refills    LISINOPRIL 20 MG Oral Tab [Pharmacy Med Name: LISINOPRIL 20MG TABLETS] 90 tablet 1     Sig: TAKE 1 TABLET(20 MG) BY MOUTH DAILY FOR HIGH BLOOD PRESSURE        Hypertensive Medications Protocol Failed - 9/30/2022  1:49 PM        Failed - CMP or BMP in past 6 months     No results found for this or any previous visit (from the past 4392 hour(s)).               Failed - EGFRCR or GFRNAA > 50     GFR Evaluation              Passed - In person appointment in the past 12 or next 3 months       Recent Outpatient Visits              5 months ago Pre-op exam    [address] [facility] [address], Taylor Finney MD    Office Visit    9 months ago Acute eczematoid otitis externa, bilateral    [address] [place] [address], Mikey Castillo MD    Office Visit    11 months ago Ear discharge of both Jose D Thomson, Arminda Fabry, MD    Office Visit    1 year ago Routine physical examination    [address] [place], [place], Jose M Hyatt MD    Office Visit    1 year ago Essential hypertension    [address] [place], [place], Jose M Hyatt MD    Office Visit                 Passed - Last BP reading less than 140/90     BP Readings from Last 1 Encounters:  04/25/22 : 130/80                Passed - In person appointment or virtual visit in the past 6 months       Recent Outpatient Visits              5 months ago Pre-op exam    [address], Taylor Finney MD    Office Visit    9 months ago Acute eczematoid otitis externa, bilateral    [facility] [place], Mikey Castillo MD    Office Visit    11 months ago Ear discharge of both Jose D Thomson, Arminda Fabry, MD    Office Visit    1 year ago Routine physical examination    [facility] [address] Jose D [place], Edmar Amada Chen MD    Office Visit    1 year ago Essential hypertension    [address] [place], [address], Amada Hyatt MD    Office Visit                   Signed Prescriptions Disp Refills    PANTOPRAZOLE 40 MG Oral Tab EC 90 tablet 1     Sig: TAKE 1 TABLET(40 MG) BY MOUTH DAILY AS NEEDED        Gastrointestional Medication Protocol Passed - 9/30/2022  1:49 PM        Passed - In person appointment or virtual visit in the past 12 mos or appointment in next 3 mos       Recent Outpatient Visits              5 months ago Pre-op exam    [facility], Amada Hyatt MD    Office Visit    9 months ago Acute eczematoid otitis externa, bilateral    [address] [place], [address], Nat Edmond MD    Office Visit    11 months ago Ear discharge of both Mae Vero, [address], Bert Gardner MD    Office Visit    1 year ago Routine physical examination    [address] [place], [address], Amada Hyatt MD    Office Visit    1 year ago Essential hypertension    [address] [place], [address], Roberto Abdul MD    Office Visit                     Recent Outpatient Visits              5 months ago Pre-op exam    [address] [place], [address], Roberto Abdul MD    Office Visit    9 months ago Acute eczematoid otitis externa, bilateral    Magy Doles, Nat Edmond MD    Office Visit    11 months ago Ear discharge of both Mae [place], [address], Bert Gardner MD    Office Visit    1 year ago Routine physical examination    [address] [place], [address], Amada Hyatt MD    Office Visit    1 year ago Essential hypertension    [facility], Amada Hyatt MD    Office Visit

## 2022-10-03 RX ORDER — LISINOPRIL 20 MG/1
20 TABLET ORAL DAILY
Qty: 90 TABLET | Refills: 1 | Status: SHIPPED | OUTPATIENT
Start: 2022-10-03

## 2022-10-17 ENCOUNTER — MED REC SCAN ONLY (OUTPATIENT)
Dept: INTERNAL MEDICINE CLINIC | Facility: CLINIC | Age: 52
End: 2022-10-17

## 2022-11-21 RX ORDER — SILDENAFIL 100 MG/1
100 TABLET, FILM COATED ORAL
Qty: 18 TABLET | Refills: 1 | Status: SHIPPED | OUTPATIENT
Start: 2022-11-21

## 2022-12-02 ENCOUNTER — OFFICE VISIT (OUTPATIENT)
Dept: INTERNAL MEDICINE CLINIC | Facility: CLINIC | Age: 52
End: 2022-12-02
Payer: COMMERCIAL

## 2022-12-02 VITALS
WEIGHT: 236 LBS | OXYGEN SATURATION: 98 % | BODY MASS INDEX: 32.32 KG/M2 | HEIGHT: 71.5 IN | HEART RATE: 85 BPM | RESPIRATION RATE: 18 BRPM | TEMPERATURE: 99 F | DIASTOLIC BLOOD PRESSURE: 84 MMHG | SYSTOLIC BLOOD PRESSURE: 122 MMHG

## 2022-12-02 DIAGNOSIS — J02.9 SORE THROAT: ICD-10-CM

## 2022-12-02 DIAGNOSIS — R05.1 ACUTE COUGH: ICD-10-CM

## 2022-12-02 DIAGNOSIS — R50.9 FEVER, UNSPECIFIED FEVER CAUSE: Primary | ICD-10-CM

## 2022-12-02 DIAGNOSIS — R53.83 OTHER FATIGUE: ICD-10-CM

## 2022-12-02 LAB
CONTROL LINE PRESENT WITH A CLEAR BACKGROUND (YES/NO): YES YES/NO
KIT EXPIRATION DATE: NORMAL DATE
KIT LOT #: 4010 NUMERIC

## 2022-12-02 PROCEDURE — 99214 OFFICE O/P EST MOD 30 MIN: CPT | Performed by: INTERNAL MEDICINE

## 2022-12-02 PROCEDURE — 87880 STREP A ASSAY W/OPTIC: CPT | Performed by: INTERNAL MEDICINE

## 2022-12-02 PROCEDURE — 3074F SYST BP LT 130 MM HG: CPT | Performed by: INTERNAL MEDICINE

## 2022-12-02 PROCEDURE — 3079F DIAST BP 80-89 MM HG: CPT | Performed by: INTERNAL MEDICINE

## 2022-12-02 PROCEDURE — 3008F BODY MASS INDEX DOCD: CPT | Performed by: INTERNAL MEDICINE

## 2022-12-03 LAB
FLUAV + FLUBV RNA SPEC NAA+PROBE: NOT DETECTED
FLUAV + FLUBV RNA SPEC NAA+PROBE: NOT DETECTED
RSV RNA SPEC NAA+PROBE: NOT DETECTED
SARS-COV-2 RNA RESP QL NAA+PROBE: DETECTED

## 2023-02-08 ENCOUNTER — OFFICE VISIT (OUTPATIENT)
Dept: INTERNAL MEDICINE CLINIC | Facility: CLINIC | Age: 53
End: 2023-02-08

## 2023-02-08 VITALS
DIASTOLIC BLOOD PRESSURE: 106 MMHG | TEMPERATURE: 98 F | BODY MASS INDEX: 33.69 KG/M2 | SYSTOLIC BLOOD PRESSURE: 158 MMHG | WEIGHT: 246 LBS | HEART RATE: 82 BPM | RESPIRATION RATE: 20 BRPM | HEIGHT: 71.5 IN

## 2023-02-08 DIAGNOSIS — I10 ESSENTIAL HYPERTENSION: ICD-10-CM

## 2023-02-08 DIAGNOSIS — M25.511 CHRONIC RIGHT SHOULDER PAIN: ICD-10-CM

## 2023-02-08 DIAGNOSIS — Z00.00 ROUTINE PHYSICAL EXAMINATION: Primary | ICD-10-CM

## 2023-02-08 DIAGNOSIS — G89.29 CHRONIC RIGHT SHOULDER PAIN: ICD-10-CM

## 2023-02-08 DIAGNOSIS — Z98.890 HISTORY OF ARTHROPLASTY OF LEFT HIP: ICD-10-CM

## 2023-02-08 DIAGNOSIS — Z86.16 HISTORY OF COVID-19: ICD-10-CM

## 2023-02-08 PROCEDURE — 3080F DIAST BP >= 90 MM HG: CPT | Performed by: INTERNAL MEDICINE

## 2023-02-08 PROCEDURE — 3077F SYST BP >= 140 MM HG: CPT | Performed by: INTERNAL MEDICINE

## 2023-02-08 PROCEDURE — 3008F BODY MASS INDEX DOCD: CPT | Performed by: INTERNAL MEDICINE

## 2023-02-08 PROCEDURE — 99396 PREV VISIT EST AGE 40-64: CPT | Performed by: INTERNAL MEDICINE

## 2023-02-08 RX ORDER — DICLOFENAC SODIUM 75 MG/1
75 TABLET, DELAYED RELEASE ORAL
COMMUNITY
Start: 2022-10-14 | End: 2023-02-08 | Stop reason: ALTCHOICE

## 2023-02-08 RX ORDER — LISINOPRIL AND HYDROCHLOROTHIAZIDE 25; 20 MG/1; MG/1
1 TABLET ORAL DAILY
Qty: 90 TABLET | Refills: 1 | Status: SHIPPED | OUTPATIENT
Start: 2023-02-08

## 2023-02-08 RX ORDER — TRAMADOL HYDROCHLORIDE 50 MG/1
TABLET ORAL EVERY 8 HOURS
COMMUNITY
Start: 2022-05-03 | End: 2023-02-08 | Stop reason: ALTCHOICE

## 2023-02-08 RX ORDER — MELOXICAM 15 MG/1
1 TABLET ORAL
COMMUNITY
Start: 2022-06-09 | End: 2023-02-08 | Stop reason: ALTCHOICE

## 2023-02-08 RX ORDER — AMOXICILLIN 250 MG
2 CAPSULE ORAL DAILY
COMMUNITY
Start: 2022-04-26 | End: 2023-02-08 | Stop reason: ALTCHOICE

## 2023-02-09 ENCOUNTER — TELEPHONE (OUTPATIENT)
Facility: CLINIC | Age: 53
End: 2023-02-09

## 2023-02-09 ENCOUNTER — OFFICE VISIT (OUTPATIENT)
Dept: GASTROENTEROLOGY | Facility: CLINIC | Age: 53
End: 2023-02-09
Payer: COMMERCIAL

## 2023-02-09 VITALS
DIASTOLIC BLOOD PRESSURE: 102 MMHG | BODY MASS INDEX: 33.69 KG/M2 | WEIGHT: 246 LBS | HEIGHT: 71.5 IN | HEART RATE: 64 BPM | SYSTOLIC BLOOD PRESSURE: 167 MMHG

## 2023-02-09 DIAGNOSIS — K21.9 GASTROESOPHAGEAL REFLUX DISEASE, UNSPECIFIED WHETHER ESOPHAGITIS PRESENT: Primary | ICD-10-CM

## 2023-02-09 DIAGNOSIS — I10 PRIMARY HYPERTENSION: ICD-10-CM

## 2023-02-09 DIAGNOSIS — R79.89 ELEVATED LFTS: ICD-10-CM

## 2023-02-09 DIAGNOSIS — Z12.11 COLON CANCER SCREENING: ICD-10-CM

## 2023-02-09 DIAGNOSIS — Z80.0 FAMILY HISTORY OF GASTRIC CANCER: ICD-10-CM

## 2023-02-09 DIAGNOSIS — R14.0 BLOATING: ICD-10-CM

## 2023-02-09 DIAGNOSIS — R11.0 NAUSEA: ICD-10-CM

## 2023-02-09 PROCEDURE — 3077F SYST BP >= 140 MM HG: CPT | Performed by: NURSE PRACTITIONER

## 2023-02-09 PROCEDURE — 3080F DIAST BP >= 90 MM HG: CPT | Performed by: NURSE PRACTITIONER

## 2023-02-09 PROCEDURE — 3008F BODY MASS INDEX DOCD: CPT | Performed by: NURSE PRACTITIONER

## 2023-02-09 PROCEDURE — S0285 CNSLT BEFORE SCREEN COLONOSC: HCPCS | Performed by: NURSE PRACTITIONER

## 2023-02-09 RX ORDER — POLYETHYLENE GLYCOL 3350, SODIUM CHLORIDE, SODIUM BICARBONATE, POTASSIUM CHLORIDE 420; 11.2; 5.72; 1.48 G/4L; G/4L; G/4L; G/4L
POWDER, FOR SOLUTION ORAL
Qty: 4000 ML | Refills: 0 | Status: SHIPPED | OUTPATIENT
Start: 2023-02-09

## 2023-02-10 NOTE — TELEPHONE ENCOUNTER
Spoke with Pt states that he will call us back to schedule his procedure after his surgery in August.     T/E closed

## 2023-02-12 LAB
ABSOLUTE BASOPHILS: 62 CELLS/UL (ref 0–200)
ABSOLUTE EOSINOPHILS: 151 CELLS/UL (ref 15–500)
ABSOLUTE LYMPHOCYTES: 1434 CELLS/UL (ref 850–3900)
ABSOLUTE MONOCYTES: 532 CELLS/UL (ref 200–950)
ABSOLUTE NEUTROPHILS: 3422 CELLS/UL (ref 1500–7800)
ALBUMIN/GLOBULIN RATIO: 1.6 (CALC) (ref 1–2.5)
ALBUMIN: 4.5 G/DL (ref 3.6–5.1)
ALKALINE PHOSPHATASE: 64 U/L (ref 35–144)
ALT: 60 U/L (ref 9–46)
AST: 35 U/L (ref 10–35)
BASOPHILS: 1.1 %
BILIRUBIN, TOTAL: 0.7 MG/DL (ref 0.2–1.2)
BUN: 13 MG/DL (ref 7–25)
CALCIUM: 10.1 MG/DL (ref 8.6–10.3)
CARBON DIOXIDE: 32 MMOL/L (ref 20–32)
CHLORIDE: 99 MMOL/L (ref 98–110)
CHOL/HDLC RATIO: 7.1 (CALC)
CHOLESTEROL, TOTAL: 177 MG/DL
CREATININE: 0.72 MG/DL (ref 0.7–1.3)
EGFR: 111 ML/MIN/1.73M2
EOSINOPHILS: 2.7 %
GLOBULIN: 2.9 G/DL (CALC) (ref 1.9–3.7)
GLUCOSE: 80 MG/DL (ref 65–99)
HDL CHOLESTEROL: 25 MG/DL
HEMATOCRIT: 46.6 % (ref 38.5–50)
HEMOGLOBIN: 15.1 G/DL (ref 13.2–17.1)
LDL-CHOLESTEROL: 135 MG/DL (CALC)
LYMPHOCYTES: 25.6 %
MCH: 28.5 PG (ref 27–33)
MCHC: 32.4 G/DL (ref 32–36)
MCV: 87.9 FL (ref 80–100)
MONOCYTES: 9.5 %
MPV: 9.1 FL (ref 7.5–12.5)
NEUTROPHILS: 61.1 %
NON-HDL CHOLESTEROL: 152 MG/DL (CALC)
PLATELET COUNT: 543 THOUSAND/UL (ref 140–400)
POTASSIUM: 5.3 MMOL/L (ref 3.5–5.3)
PROTEIN, TOTAL: 7.4 G/DL (ref 6.1–8.1)
PSA, TOTAL: 0.51 NG/ML
RDW: 12.2 % (ref 11–15)
RED BLOOD CELL COUNT: 5.3 MILLION/UL (ref 4.2–5.8)
SODIUM: 139 MMOL/L (ref 135–146)
TRIGLYCERIDES: 77 MG/DL
TSH W/REFLEX TO FT4: 1.2 MIU/L (ref 0.4–4.5)
WHITE BLOOD CELL COUNT: 5.6 THOUSAND/UL (ref 3.8–10.8)

## 2023-04-19 ENCOUNTER — OFFICE VISIT (OUTPATIENT)
Dept: INTERNAL MEDICINE CLINIC | Facility: CLINIC | Age: 53
End: 2023-04-19

## 2023-04-19 VITALS
RESPIRATION RATE: 20 BRPM | WEIGHT: 240 LBS | SYSTOLIC BLOOD PRESSURE: 124 MMHG | TEMPERATURE: 98 F | BODY MASS INDEX: 32.86 KG/M2 | HEIGHT: 71.5 IN | DIASTOLIC BLOOD PRESSURE: 78 MMHG | HEART RATE: 72 BPM

## 2023-04-19 DIAGNOSIS — F32.A ANXIETY AND DEPRESSION: ICD-10-CM

## 2023-04-19 DIAGNOSIS — Z01.818 PRE-OP EXAM: Primary | ICD-10-CM

## 2023-04-19 DIAGNOSIS — I10 ESSENTIAL HYPERTENSION: ICD-10-CM

## 2023-04-19 DIAGNOSIS — M06.1 ADULT-ONSET STILL'S DISEASE (HCC): ICD-10-CM

## 2023-04-19 DIAGNOSIS — M25.511 CHRONIC RIGHT SHOULDER PAIN: ICD-10-CM

## 2023-04-19 DIAGNOSIS — F41.9 ANXIETY AND DEPRESSION: ICD-10-CM

## 2023-04-19 DIAGNOSIS — G89.29 CHRONIC RIGHT SHOULDER PAIN: ICD-10-CM

## 2023-04-19 DIAGNOSIS — Z98.890 HISTORY OF ARTHROPLASTY OF LEFT HIP: ICD-10-CM

## 2023-04-19 PROCEDURE — 99214 OFFICE O/P EST MOD 30 MIN: CPT | Performed by: INTERNAL MEDICINE

## 2023-04-19 PROCEDURE — 3074F SYST BP LT 130 MM HG: CPT | Performed by: INTERNAL MEDICINE

## 2023-04-19 PROCEDURE — 3008F BODY MASS INDEX DOCD: CPT | Performed by: INTERNAL MEDICINE

## 2023-04-19 PROCEDURE — 3078F DIAST BP <80 MM HG: CPT | Performed by: INTERNAL MEDICINE

## 2023-04-26 ENCOUNTER — TELEPHONE (OUTPATIENT)
Dept: INTERNAL MEDICINE CLINIC | Facility: CLINIC | Age: 53
End: 2023-04-26

## 2023-04-26 DIAGNOSIS — Z01.818 PRE-OP EXAMINATION: Primary | ICD-10-CM

## 2023-04-26 NOTE — TELEPHONE ENCOUNTER
Please call the patient. We have received notification from the surgeon as to what is needed. I have generated the order for what I think is needed to give medical clearance for the surgery. This would include blood, urine, and EKG. I have pended the orders. They are pended for [facility] laboratories.   Please contact patient and find out whether that is what he wants or if he prefers to have the blood work done through [facility].

## 2023-04-27 NOTE — TELEPHONE ENCOUNTER
Spoke to patient in regards to message below. Patient stated he would like to have pre-op labs, urine and EKG completed at [facility] office lab.

## 2023-04-29 ENCOUNTER — LAB ENCOUNTER (OUTPATIENT)
Dept: LAB | Age: 53
End: 2023-04-29
Attending: INTERNAL MEDICINE
Payer: COMMERCIAL

## 2023-04-29 DIAGNOSIS — Z01.811 PRE-OP CHEST EXAM: Primary | ICD-10-CM

## 2023-04-29 LAB
ALBUMIN SERPL-MCNC: 3.7 G/DL (ref 3.4–5)
ALBUMIN/GLOB SERPL: 1 {RATIO} (ref 1–2)
ALP LIVER SERPL-CCNC: 55 U/L
ALT SERPL-CCNC: 26 U/L
ANION GAP SERPL CALC-SCNC: 7 MMOL/L (ref 0–18)
AST SERPL-CCNC: 12 U/L (ref 15–37)
BASOPHILS # BLD AUTO: 0.05 X10(3) UL (ref 0–0.2)
BASOPHILS NFR BLD AUTO: 0.8 %
BILIRUB SERPL-MCNC: 0.4 MG/DL (ref 0.1–2)
BILIRUB UR QL: NEGATIVE
BUN BLD-MCNC: 18 MG/DL (ref 7–18)
BUN/CREAT SERPL: 23.7 (ref 10–20)
CALCIUM BLD-MCNC: 8.9 MG/DL (ref 8.5–10.1)
CHLORIDE SERPL-SCNC: 105 MMOL/L (ref 98–112)
CLARITY UR: CLEAR
CO2 SERPL-SCNC: 28 MMOL/L (ref 21–32)
CREAT BLD-MCNC: 0.76 MG/DL
DEPRECATED RDW RBC AUTO: 42.3 FL (ref 35.1–46.3)
EOSINOPHIL # BLD AUTO: 0.21 X10(3) UL (ref 0–0.7)
EOSINOPHIL NFR BLD AUTO: 3.3 %
ERYTHROCYTE [DISTWIDTH] IN BLOOD BY AUTOMATED COUNT: 13.5 % (ref 11–15)
FASTING STATUS PATIENT QL REPORTED: YES
GFR SERPLBLD BASED ON 1.73 SQ M-ARVRAT: 109 ML/MIN/1.73M2 (ref 60–?)
GLOBULIN PLAS-MCNC: 3.6 G/DL (ref 2.8–4.4)
GLUCOSE BLD-MCNC: 106 MG/DL (ref 70–99)
GLUCOSE UR-MCNC: NORMAL MG/DL
HCT VFR BLD AUTO: 41.8 %
HGB BLD-MCNC: 13.4 G/DL
IMM GRANULOCYTES # BLD AUTO: 0.02 X10(3) UL (ref 0–1)
IMM GRANULOCYTES NFR BLD: 0.3 %
KETONES UR-MCNC: NEGATIVE MG/DL
LEUKOCYTE ESTERASE UR QL STRIP.AUTO: NEGATIVE
LYMPHOCYTES # BLD AUTO: 1.43 X10(3) UL (ref 1–4)
LYMPHOCYTES NFR BLD AUTO: 22.4 %
MCH RBC QN AUTO: 27.3 PG (ref 26–34)
MCHC RBC AUTO-ENTMCNC: 32.1 G/DL (ref 31–37)
MCV RBC AUTO: 85.3 FL
MONOCYTES # BLD AUTO: 0.46 X10(3) UL (ref 0.1–1)
MONOCYTES NFR BLD AUTO: 7.2 %
NEUTROPHILS # BLD AUTO: 4.22 X10 (3) UL (ref 1.5–7.7)
NEUTROPHILS # BLD AUTO: 4.22 X10(3) UL (ref 1.5–7.7)
NEUTROPHILS NFR BLD AUTO: 66 %
NITRITE UR QL STRIP.AUTO: NEGATIVE
OSMOLALITY SERPL CALC.SUM OF ELEC: 292 MOSM/KG (ref 275–295)
PH UR: 5.5 [PH] (ref 5–8)
PLATELET # BLD AUTO: 318 10(3)UL (ref 150–450)
POTASSIUM SERPL-SCNC: 4 MMOL/L (ref 3.5–5.1)
PROT SERPL-MCNC: 7.3 G/DL (ref 6.4–8.2)
PROT UR-MCNC: NEGATIVE MG/DL
RBC # BLD AUTO: 4.9 X10(6)UL
SODIUM SERPL-SCNC: 140 MMOL/L (ref 136–145)
SP GR UR STRIP: 1.02 (ref 1–1.03)
UROBILINOGEN UR STRIP-ACNC: NORMAL
WBC # BLD AUTO: 6.4 X10(3) UL (ref 4–11)

## 2023-04-29 PROCEDURE — 93010 ELECTROCARDIOGRAM REPORT: CPT | Performed by: INTERNAL MEDICINE

## 2023-04-29 PROCEDURE — 36415 COLL VENOUS BLD VENIPUNCTURE: CPT | Performed by: INTERNAL MEDICINE

## 2023-04-29 PROCEDURE — 93005 ELECTROCARDIOGRAM TRACING: CPT

## 2023-04-29 PROCEDURE — 80053 COMPREHEN METABOLIC PANEL: CPT | Performed by: INTERNAL MEDICINE

## 2023-04-29 PROCEDURE — 81001 URINALYSIS AUTO W/SCOPE: CPT | Performed by: INTERNAL MEDICINE

## 2023-04-29 PROCEDURE — 85025 COMPLETE CBC W/AUTO DIFF WBC: CPT | Performed by: INTERNAL MEDICINE

## 2023-04-30 LAB
ATRIAL RATE: 69 BPM
P AXIS: 35 DEGREES
P-R INTERVAL: 156 MS
Q-T INTERVAL: 380 MS
QRS DURATION: 98 MS
QTC CALCULATION (BEZET): 407 MS
R AXIS: 47 DEGREES
T AXIS: 13 DEGREES
VENTRICULAR RATE: 69 BPM

## 2023-05-01 ENCOUNTER — TELEPHONE (OUTPATIENT)
Dept: INTERNAL MEDICINE CLINIC | Facility: CLINIC | Age: 53
End: 2023-05-01

## 2023-05-01 NOTE — TELEPHONE ENCOUNTER
Siobhan from Dr. Kevan Cheatham office is calling to request surgical medical clearance for patient scheduled for surgery on 5/9/23.      Labs, Ekg reports, etc.     FAX # [phone]    Any questions, call back [phone]    Dr. Moraima Mayorga

## 2023-05-02 NOTE — TELEPHONE ENCOUNTER
I reviewed the preoperative evaluation. Patient is medically cleared for surgery. I have placed an addendum on my last progress note stating that he is clear. Okay to forward that note to the surgeon's office.

## 2023-05-03 NOTE — TELEPHONE ENCOUNTER
Medical clearance, lab results, and EKG faxed to surgeon office at [phone] , confirmation received.

## 2023-06-22 RX ORDER — PANTOPRAZOLE SODIUM 40 MG/1
40 TABLET, DELAYED RELEASE ORAL
Qty: 90 TABLET | Refills: 3 | Status: SHIPPED | OUTPATIENT
Start: 2023-06-22

## 2023-06-22 NOTE — TELEPHONE ENCOUNTER
Refill passed per [facility] protocol. Requested Prescriptions   Pending Prescriptions Disp Refills    pantoprazole 40 MG Oral Tab EC 90 tablet 1     Sig: Take 1 tablet (40 mg total) by mouth daily as needed.        Gastrointestional Medication Protocol Passed - 6/22/2023 10:15 AM        Passed - In person appointment or virtual visit in the past 12 mos or appointment in next 3 mos     Recent Outpatient Visits              2 months ago Pre-op exam    Keaton Louis MD    Office Visit    4 months ago Gastroesophageal reflux disease, unspecified whether esophagitis present    Mara Gaston, AYANNA    Office Visit    4 months ago Routine physical examination    Jagdeep Louis MD    Office Visit    6 months ago Fever, unspecified fever cause    [facility], [address], Barney Alberto MD    Office Visit    1 year ago Pre-op exam    Keaton Gaston MD    Office Visit                         Recent Outpatient Visits              2 months ago Pre-op exam    Jagdeep Louis MD    Office Visit    4 months ago Gastroesophageal reflux disease, unspecified whether esophagitis present    [facility], [address], Mara Li, APRN    Office Visit    4 months ago Routine physical examination    Versa Schlatter, MD    Office Visit    6 months ago Fever, unspecified fever cause    [place], [address], Barney Alberto MD    Office Visit    1 year ago Pre-op exam    Edmar Gaston Alto Sager, MD    Office Visit

## 2023-08-09 ENCOUNTER — TELEPHONE (OUTPATIENT)
Dept: INTERNAL MEDICINE CLINIC | Facility: CLINIC | Age: 53
End: 2023-08-09

## 2023-08-09 NOTE — TELEPHONE ENCOUNTER
Spoke with patient. He saw work comp doctor today and began PT today. He is worried that PT will make his knee pain worse. I explained to him that PT and other conservative measures are often recommended prior to advanced imaging. The patient was prescribed naproxen. He inquires if ice may be applied. I advised the patient to communicate how he is feeling and progressing to the doctor that treated him, if PT seems to be exacerbating his symptoms he may move forward with imaging or other treatment. I advised the patient he may use ice. Patient will inquire with work comp to see if he may see PCP. He will keep office apprised.

## 2023-08-09 NOTE — TELEPHONE ENCOUNTER
Patient is calling to advise PCP he had work injury on 8/8/23 on his right knee and patient has been seen by employers preferred provider, [address] [place] [address]  Patient is concerned with the possible diagnosis of a torn meniscus and being advised first to get therapy. Patient was advised xrays did not show anything. Patient would like to ask PCP if he would advise of therapy. Patient will be calling his workers comp provider to see if he has the option to see his primary care provider. Please advise.

## 2023-08-21 NOTE — TELEPHONE ENCOUNTER
Pt asking for a refill of Viagra    Send to:  [address] [facility], [address] [facility] [phone], [address] [place] [id number]

## 2023-08-22 RX ORDER — SILDENAFIL 100 MG/1
100 TABLET, FILM COATED ORAL
Qty: 18 TABLET | Refills: 1 | Status: SHIPPED | OUTPATIENT
Start: 2023-08-22

## 2023-08-22 NOTE — TELEPHONE ENCOUNTER
Refill passed per [address] [place] [address] protocol. Requested Prescriptions   Pending Prescriptions Disp Refills    Sildenafil Citrate 100 MG Oral Tab 18 tablet 1     Sig: Take 1 tablet (100 mg total) by mouth daily as needed for Erectile Dysfunction.        Genitourinary Medications Passed - 8/21/2023  2:18 PM        Passed - Patient does not have pulmonary hypertension on problem list        Passed - In person appointment or virtual visit in the past 12 mos or appointment in next 3 mos     Recent Outpatient Visits              4 months ago Pre-op exam    Evelyn Hemphill MD    Office Visit    6 months ago Gastroesophageal reflux disease, unspecified whether esophagitis present    [facility], [address] [place] [address]    Office Visit    6 months ago Routine physical examination    Nelma Eisenmenger, [place] Thurmond Solian, MD    Office Visit    8 months ago Fever, unspecified fever cause    [facility], [address], Jesus Edgar MD    Office Visit    1 year ago Pre-op exam    [address] [facility], Sherin Lema MD    Office Visit                                 Recent Outpatient Visits              4 months ago Pre-op exam    Nelma Eisenmenger, [place] Thurmond Solian, MD    Office Visit    6 months ago Gastroesophageal reflux disease, unspecified whether esophagitis present    [facility], [address]    Office Visit    6 months ago Routine physical examination    Evelyn Hemphill MD    Office Visit    8 months ago Fever, unspecified fever cause    [address] [facility], Jesus Edgar MD    Office Visit    1 year ago Pre-op exam    [facility] Edmar Ha Clarence Prom, MD    Office Visit

## 2023-08-25 RX ORDER — LISINOPRIL AND HYDROCHLOROTHIAZIDE 25; 20 MG/1; MG/1
1 TABLET ORAL DAILY
Qty: 90 TABLET | Refills: 2 | Status: SHIPPED | OUTPATIENT
Start: 2023-08-25

## 2023-08-25 NOTE — TELEPHONE ENCOUNTER
Refill passed per [facility] protocol. Requested Prescriptions   Pending Prescriptions Disp Refills    lisinopril-hydroCHLOROthiazide 20-25 MG Oral Tab 90 tablet 1     Sig: Take 1 tablet by mouth daily.        Hypertensive Medications Protocol Passed - 8/25/2023  2:35 PM        Passed - In person appointment in the past 12 or next 3 months     Recent Outpatient Visits              4 months ago Pre-op exam    [address] Amada Sam, MD    Office Visit    6 months ago Gastroesophageal reflux disease, unspecified whether esophagitis present    [facility], [place], APRN    Office Visit    6 months ago Routine physical examination    [address] Amada Sam, MD    Office Visit    8 months ago Fever, unspecified fever cause    Jodee Eddy, [address] [facility],[address], [place], Davian Floyd MD    Office Visit    1 year ago Pre-op exam    [address] [facility], Pastora Hyatt MD    Office Visit                      Passed - Last BP reading less than 140/90     BP Readings from Last 1 Encounters:  04/19/23 : 124/78              Passed - CMP or BMP in past 6 months     Recent Results (from the past 4392 hour(s))   COMP METABOLIC PANEL (14)    Collection Time: 04/29/23  8:23 AM   Result Value Ref Range    Glucose 106 (H) 70 - 99 mg/dL    Sodium 140 136 - 145 mmol/L    Potassium 4.0 3.5 - 5.1 mmol/L    Chloride 105 98 - 112 mmol/L    CO2 28.0 21.0 - 32.0 mmol/L    Anion Gap 7 0 - 18 mmol/L    BUN 18 7 - 18 mg/dL    Creatinine 0.76 0.70 - 1.30 mg/dL    BUN/CREA Ratio 23.7 (H) 10.0 - 20.0    Calcium, Total 8.9 8.5 - 10.1 mg/dL    Calculated Osmolality 292 275 - 295 mOsm/kg    eGFR-Cr 109 >=60 mL/min/1.73m2    ALT 26 16 - 61 U/L    AST 12 (L) 15 - 37 U/L    Alkaline Phosphatase 55 45 - 117 U/L    Bilirubin, Total 0.4 0.1 - 2.0 mg/dL Total Protein 7.3 6.4 - 8.2 g/dL    Albumin 3.7 3.4 - 5.0 g/dL    Globulin  3.6 2.8 - 4.4 g/dL    A/G Ratio 1.0 1.0 - 2.0    Patient Fasting for CMP? Yes      *Note: Due to a large number of results and/or encounters for the requested time period, some results have not been displayed. A complete set of results can be found in Results Review.                Passed - In person appointment or virtual visit in the past 6 months     Recent Outpatient Visits              4 months ago Pre-op exam    Rayna Duron MD    Office Visit    6 months ago Gastroesophageal reflux disease, unspecified whether esophagitis present    [facility], Mara Gonzalez, APRBREANN    Office Visit    6 months ago Routine physical examination    Jagdeep Duron MD    Office Visit    8 months ago Fever, unspecified fever cause    [facility], [address] [facility], Maris Kapoor MD    Office Visit    1 year ago [facility]-op exam    [facility]    Office Visit                      Passed - EGFRCR or GFRNAA > 50     GFR Evaluation  EGFRCR: 109 , resulted on 4/29/2023               Recent Outpatient Visits              4 months ago Pre-op exam    Jagdeep Duron MD    Office Visit    6 months ago Gastroesophageal reflux disease, unspecified whether esophagitis present    [facility], Mara Gonzalez, APRBREANN    Office Visit    6 months ago Routine physical examination    Josephine Mayer MD    Office Visit    8 months ago Fever, unspecified fever cause    [address] [place] [address], Maris Kapoor MD    Office Visit    1 year ago Pre-op exam [address], Adry Hyatt MD    Office Visit

## 2023-08-25 NOTE — TELEPHONE ENCOUNTER
Current Outpatient Medications:       lisinopril-hydroCHLOROthiazide 20-25 MG Oral Tab, Take 1 tablet by mouth daily. , Disp: 90 tablet, Rfl: 1

## 2023-12-28 RX ORDER — SILDENAFIL 100 MG/1
100 TABLET, FILM COATED ORAL
Qty: 18 TABLET | Refills: 1 | Status: SHIPPED | OUTPATIENT
Start: 2023-12-28

## 2023-12-28 NOTE — TELEPHONE ENCOUNTER
Pt would like a refill on his viagara medication. Per the patient he is out of medication. Pharmacy: [facility]/[place], [place] (listed)     Current Outpatient Medications   Medication Sig Dispense Refill    Sildenafil Citrate 100 MG Oral Tab Take 1 tablet (100 mg total) by mouth daily as needed for Erectile Dysfunction.  18 tablet 1

## 2023-12-28 NOTE — TELEPHONE ENCOUNTER
Refill passed per [facility] protocol. Requested Prescriptions   Pending Prescriptions Disp Refills    Sildenafil Citrate 100 MG Oral Tab 18 tablet 1     Sig: Take 1 tablet (100 mg total) by mouth daily as needed for Erectile Dysfunction.        Genitourinary Medications Passed - 12/28/2023  2:27 PM        Passed - Patient does not have pulmonary hypertension on problem list        Passed - In person appointment or virtual visit in the past 12 mos or appointment in next 3 mos     Recent Outpatient Visits              8 months ago Pre-op exam    Versa Schlatter, MD    Office Visit    10 months ago Gastroesophageal reflux disease, unspecified whether esophagitis present    [facility]    Office Visit    10 months ago Routine physical examination    Jagdeep Louis MD    Office Visit    1 year ago Fever, unspecified fever cause    [facility], [address], Barney Alberto MD    Office Visit    1 year ago Pre-op exam    Keaton Gaston MD    Office Visit                         Recent Outpatient Visits              8 months ago Pre-op exam    Jagdeep Louis MD    Office Visit    10 months ago Gastroesophageal reflux disease, unspecified whether esophagitis present    [facility] [address] [facility]    Office Visit    10 months ago Routine physical examination    Jagdeep Louis MD    Office Visit    1 year ago Fever, unspecified fever cause    Marty Wang, Barney Alberto MD    Office Visit    1 year ago Pre-op exam    [address] [facility] Isrrael Ruvalcaba MD    Office Visit

## 2024-02-12 ENCOUNTER — OFFICE VISIT (OUTPATIENT)
Facility: CLINIC | Age: 54
End: 2024-02-12
Payer: COMMERCIAL

## 2024-02-12 VITALS
BODY MASS INDEX: 33.55 KG/M2 | WEIGHT: 245 LBS | SYSTOLIC BLOOD PRESSURE: 126 MMHG | HEIGHT: 71.5 IN | DIASTOLIC BLOOD PRESSURE: 80 MMHG | RESPIRATION RATE: 20 BRPM | HEART RATE: 80 BPM | TEMPERATURE: 99 F

## 2024-02-12 DIAGNOSIS — Z00.00 ROUTINE PHYSICAL EXAMINATION: Primary | ICD-10-CM

## 2024-02-12 DIAGNOSIS — F32.A ANXIETY AND DEPRESSION: ICD-10-CM

## 2024-02-12 DIAGNOSIS — N52.9 ERECTILE DYSFUNCTION, UNSPECIFIED ERECTILE DYSFUNCTION TYPE: ICD-10-CM

## 2024-02-12 DIAGNOSIS — M06.1 ADULT-ONSET STILL'S DISEASE (HCC): ICD-10-CM

## 2024-02-12 DIAGNOSIS — F41.9 ANXIETY AND DEPRESSION: ICD-10-CM

## 2024-02-12 DIAGNOSIS — Z12.11 COLON CANCER SCREENING: ICD-10-CM

## 2024-02-12 DIAGNOSIS — I10 ESSENTIAL HYPERTENSION: ICD-10-CM

## 2024-02-12 DIAGNOSIS — M50.90 CERVICAL DISC DISEASE: ICD-10-CM

## 2024-02-12 PROCEDURE — 3079F DIAST BP 80-89 MM HG: CPT | Performed by: INTERNAL MEDICINE

## 2024-02-12 PROCEDURE — 99396 PREV VISIT EST AGE 40-64: CPT | Performed by: INTERNAL MEDICINE

## 2024-02-12 PROCEDURE — 3074F SYST BP LT 130 MM HG: CPT | Performed by: INTERNAL MEDICINE

## 2024-02-12 PROCEDURE — 3008F BODY MASS INDEX DOCD: CPT | Performed by: INTERNAL MEDICINE

## 2024-02-12 RX ORDER — CYCLOBENZAPRINE HCL 5 MG
5 TABLET ORAL NIGHTLY PRN
COMMUNITY
Start: 2024-02-06

## 2024-02-12 RX ORDER — PANTOPRAZOLE SODIUM 40 MG/1
40 TABLET, DELAYED RELEASE ORAL
Qty: 90 TABLET | Refills: 3 | Status: SHIPPED | OUTPATIENT
Start: 2024-02-12

## 2024-02-12 RX ORDER — PANTOPRAZOLE SODIUM 40 MG/1
40 TABLET, DELAYED RELEASE ORAL
Qty: 90 TABLET | Refills: 3 | Status: CANCELLED | OUTPATIENT
Start: 2024-02-12

## 2024-02-12 RX ORDER — LISINOPRIL AND HYDROCHLOROTHIAZIDE 25; 20 MG/1; MG/1
1 TABLET ORAL DAILY
Qty: 90 TABLET | Refills: 2 | Status: CANCELLED | OUTPATIENT
Start: 2024-02-12

## 2024-02-12 RX ORDER — SILDENAFIL 100 MG/1
100 TABLET, FILM COATED ORAL
Qty: 18 TABLET | Refills: 3 | Status: SHIPPED | OUTPATIENT
Start: 2024-02-12

## 2024-02-12 NOTE — PROGRESS NOTES
HPI:    Patient ID: Denilson Hayes is a 53 year old male.    HPI  Patient is here requesting a physical exam.  I last saw him April of last year for preop prior to undergoing right shoulder arthroplasty.  Also history of left total hip replacement for avascular necrosis.  Current medications reviewed.  Health maintenance and vaccination status reviewed.  Patient is due for full blood work.  Also colon cancer screening.     The shoulder is doing well; still not full strength but does have full ROM. The THR is still a little tight with hip flexion. He is active with going to the gym. REcently with pinched nerve in neck; he is seeing chiro and PT; it is better but not great; no clear precipitant to the pain. He is sleeping better now. Not checking BP. Mood is ok; but still battles with anxiety and depression. Exercising at the gym helps his mental well being. He did not like the prior trials of psych meds. He has been on buspirone and Xanax in the past. GERD is controlled with PPI. Diet is not healthy. .       Patient Active Problem List   Diagnosis    Adult-onset Still's disease (HCC)    Avascular necrosis of hip (HCC)    Essential hypertension    Carpal tunnel syndrome    Chronic pain of left knee    Anxiety and depression    Fatty liver    History of 2019 novel coronavirus disease (COVID-19)          HISTORY:  Past Medical History:   Diagnosis Date    Alcohol withdrawal (HCC) 5/1/2019    Alcohol withdrawal syndrome without complication (HCC) 5/1/2019    Anxiety state     Carpal tunnel syndrome, left     Depression     Essential hypertension     High blood pressure     Hypertension     Migraines     MRSA (methicillin resistant Staphylococcus aureus) 2011    MRSA groin abcess - hospitalized; IV antibiotics; debridement    Pneumonia 2010    left lower lobe - out pt. antibiotics    PONV (postoperative nausea and vomiting)     Rheumatoid arthritis (HCC)     Still's disease (HCC)     Still's disease (HCC)     Still's disease  (HCC)     Tinnitus     Transaminitis 2019    Visual impairment     going to see an eye       Past Surgical History:   Procedure Laterality Date    OTHER SURGICAL HISTORY      hernia repair     OTHER SURGICAL HISTORY      right thigh cellulitis    WRIST ARTHROSCOP,RELEASE XVERS LIG Left 3-3-17    endoscopic carpal tunnel release      Family History   Problem Relation Age of Onset    Diabetes Father     Hypertension Father     Cancer Mother         cervical    Diabetes Mother     Hypertension Mother     Depression Sister     Hypertension Brother       Social History     Socioeconomic History    Marital status: Single   Tobacco Use    Smoking status: Former     Years: 10     Types: Cigarettes     Quit date: 2012     Years since quittin.5    Smokeless tobacco: Former     Quit date: 2012    Tobacco comments:     1 pk would last a week   Vaping Use    Vaping Use: Some days    Last attempt to quit: 2020    Substances: Nicotine    Devices: Refillable tank   Substance and Sexual Activity    Alcohol use: Not Currently     Alcohol/week: 0.0 standard drinks of alcohol     Comment: 1 x a week    Drug use: No    Sexual activity: Not Currently     Partners: Female   Other Topics Concern    Caffeine Concern Yes     Comment: coffee/tea - sometimes          Review of Systems          Current Outpatient Medications   Medication Sig Dispense Refill    cyclobenzaprine 5 MG Oral Tab Take 1 tablet (5 mg total) by mouth nightly as needed. AT BEDTIME      Sildenafil Citrate 100 MG Oral Tab Take 1 tablet (100 mg total) by mouth daily as needed for Erectile Dysfunction. 18 tablet 1    lisinopril-hydroCHLOROthiazide 20-25 MG Oral Tab Take 1 tablet by mouth daily. 90 tablet 2    pantoprazole 40 MG Oral Tab EC Take 1 tablet (40 mg total) by mouth daily as needed. 90 tablet 3    multivitamin with minerals Oral Tab Take 1 tablet by mouth daily. 30 tablet 0    PEG 3350-KCl-Na Bicarb-NaCl (TRILYTE) 420 g Oral Recon  Soln Take prep as directed by gastro office. May substitute with Trilyte/generic equivalent if needed. (Patient not taking: Reported on 2/12/2024) 4000 mL 0     Allergies:No Known Allergies     PHYSICAL EXAM:   /80 (BP Location: Left arm, Patient Position: Sitting, Cuff Size: large)   Pulse 80   Temp 98.6 °F (37 °C) (Other)   Resp 20   Ht 5' 11.5\" (1.816 m)   Wt 245 lb (111.1 kg)   BMI 33.69 kg/m²      Physical Exam  Constitutional:       Appearance: Normal appearance. He is well-developed. He is obese.   HENT:      Right Ear: Tympanic membrane and ear canal normal.      Left Ear: Tympanic membrane and ear canal normal.      Nose: Nose normal.      Mouth/Throat:      Pharynx: No oropharyngeal exudate or posterior oropharyngeal erythema.   Eyes:      Conjunctiva/sclera: Conjunctivae normal.      Pupils: Pupils are equal, round, and reactive to light.   Neck:      Thyroid: No thyromegaly.      Vascular: No carotid bruit.   Cardiovascular:      Rate and Rhythm: Normal rate and regular rhythm.      Pulses: Normal pulses.      Heart sounds: Normal heart sounds. No murmur heard.  Pulmonary:      Effort: Pulmonary effort is normal.      Breath sounds: Normal breath sounds. No wheezing or rales.   Abdominal:      General: Bowel sounds are normal.      Palpations: Abdomen is soft. There is no mass.      Tenderness: There is no abdominal tenderness.      Hernia: There is no hernia in the left inguinal area.   Genitourinary:     Penis: Normal and circumcised.       Testes: Normal.   Musculoskeletal:      Right lower leg: No edema.      Left lower leg: No edema.   Lymphadenopathy:      Cervical: No cervical adenopathy.   Skin:     General: Skin is warm and dry.      Findings: No rash.   Neurological:      General: No focal deficit present.      Mental Status: He is alert.      Cranial Nerves: No cranial nerve deficit.      Coordination: Coordination normal.   Psychiatric:         Mood and Affect: Mood normal.          Behavior: Behavior normal.         Thought Content: Thought content normal.         Judgment: Judgment normal.          Wt Readings from Last 6 Encounters:   02/12/24 245 lb (111.1 kg)   04/19/23 240 lb (108.9 kg)   02/09/23 246 lb (111.6 kg)   02/08/23 246 lb (111.6 kg)   12/02/22 236 lb (107 kg)   04/25/22 240 lb (108.9 kg)             ASSESSMENT/PLAN:   1. Routine physical examination  Physical exam is unremarkable other than overweight status.  Active issues as below.  Health maintenance issues reviewed.  We will check fasting blood work and contact patient with results.  Continued working on diet and exercise.  Try to lose some weight if possible.  - CBC With Differential With Platelet; Future  - Comp Metabolic Panel (14); Future  - Lipid Panel; Future  - TSH W Reflex To Free T4; Future  - Vitamin B12; Future  - PSA, TOTAL [5363] [Q]  - CBC With Differential With Platelet  - Comp Metabolic Panel (14)  - Lipid Panel  - TSH W Reflex To Free T4  - Vitamin B12    2. Essential hypertension  Well-controlled.  Continue current treatment.    3. Anxiety and depression  Patient keeps the anxiety and depression largely controlled with lifestyle activities particularly regular vigorous exercise.  He is not interested in any medications right now.    4. Adult-onset Still's disease (HCC)  Stable.  On no treatment right now.    5. Cervical disc disease  Stable.  Continue current treatment.    6. Colon cancer screening  Patient is due for colon cancer screening.  Given referral for telephone colon screening process.  - [facility] GI Telephone Colon Screen    7. Erectile dysfunction, unspecified erectile dysfunction type  Patient with erectile dysfunction.  Refill given for Viagra.  - Sildenafil Citrate 100 MG Oral Tab; Take 1 tablet (100 mg total) by mouth daily as needed for Erectile Dysfunction.  Dispense: 18 tablet; Refill: 3         Meds This Visit:  Requested Prescriptions     Pending Prescriptions Disp Refills    pantoprazole  40 MG Oral Tab EC 90 tablet 3     Sig: Take 1 tablet (40 mg total) by mouth daily as needed.    Sildenafil Citrate 100 MG Oral Tab 18 tablet 1     Sig: Take 1 tablet (100 mg total) by mouth daily as needed for Erectile Dysfunction.       Imaging & Referrals:  None         Emanuel Cantu MD

## 2024-02-14 LAB
ABSOLUTE BASOPHILS: 42 CELLS/UL (ref 0–200)
ABSOLUTE EOSINOPHILS: 122 CELLS/UL (ref 15–500)
ABSOLUTE LYMPHOCYTES: 1569 CELLS/UL (ref 850–3900)
ABSOLUTE MONOCYTES: 456 CELLS/UL (ref 200–950)
ABSOLUTE NEUTROPHILS: 3111 CELLS/UL (ref 1500–7800)
ALBUMIN/GLOBULIN RATIO: 1.6 (CALC) (ref 1–2.5)
ALBUMIN: 4.1 G/DL (ref 3.6–5.1)
ALKALINE PHOSPHATASE: 57 U/L (ref 35–144)
ALT: 11 U/L (ref 9–46)
AST: 13 U/L (ref 10–35)
BASOPHILS: 0.8 %
BILIRUBIN, TOTAL: 0.5 MG/DL (ref 0.2–1.2)
BUN/CREATININE RATIO: 26 (CALC) (ref 6–22)
BUN: 18 MG/DL (ref 7–25)
CALCIUM: 9.3 MG/DL (ref 8.6–10.3)
CARBON DIOXIDE: 31 MMOL/L (ref 20–32)
CHLORIDE: 104 MMOL/L (ref 98–110)
CHOL/HDLC RATIO: 3 (CALC)
CHOLESTEROL, TOTAL: 143 MG/DL
CREATININE: 0.68 MG/DL (ref 0.7–1.3)
EGFR: 112 ML/MIN/1.73M2
EOSINOPHILS: 2.3 %
GLOBULIN: 2.6 G/DL (CALC) (ref 1.9–3.7)
GLUCOSE: 93 MG/DL (ref 65–99)
HDL CHOLESTEROL: 47 MG/DL
HEMATOCRIT: 41.8 % (ref 38.5–50)
HEMOGLOBIN: 13.7 G/DL (ref 13.2–17.1)
LDL-CHOLESTEROL: 83 MG/DL (CALC)
LYMPHOCYTES: 29.6 %
MCH: 28.4 PG (ref 27–33)
MCHC: 32.8 G/DL (ref 32–36)
MCV: 86.7 FL (ref 80–100)
MONOCYTES: 8.6 %
MPV: 9.2 FL (ref 7.5–12.5)
NEUTROPHILS: 58.7 %
NON-HDL CHOLESTEROL: 96 MG/DL (CALC)
PLATELET COUNT: 344 THOUSAND/UL (ref 140–400)
POTASSIUM: 4.5 MMOL/L (ref 3.5–5.3)
PROTEIN, TOTAL: 6.7 G/DL (ref 6.1–8.1)
PSA, TOTAL: 0.67 NG/ML
RDW: 12.4 % (ref 11–15)
RED BLOOD CELL COUNT: 4.82 MILLION/UL (ref 4.2–5.8)
SODIUM: 141 MMOL/L (ref 135–146)
TRIGLYCERIDES: 45 MG/DL
TSH W/REFLEX TO FT4: 0.99 MIU/L (ref 0.4–4.5)
VITAMIN B12: 672 PG/ML (ref 200–1100)
WHITE BLOOD CELL COUNT: 5.3 THOUSAND/UL (ref 3.8–10.8)

## 2024-02-26 ENCOUNTER — TELEPHONE (OUTPATIENT)
Facility: CLINIC | Age: 54
End: 2024-02-26

## 2024-02-27 ENCOUNTER — TELEPHONE (OUTPATIENT)
Facility: CLINIC | Age: 54
End: 2024-02-27

## 2024-02-27 DIAGNOSIS — R14.0 BLOATING: ICD-10-CM

## 2024-02-27 DIAGNOSIS — Z80.0 FH: GASTRIC CANCER: ICD-10-CM

## 2024-02-27 DIAGNOSIS — R11.0 NAUSEA: ICD-10-CM

## 2024-02-27 DIAGNOSIS — Z12.11 SCREEN FOR COLON CANCER: Primary | ICD-10-CM

## 2024-02-27 DIAGNOSIS — K21.9 GASTROESOPHAGEAL REFLUX DISEASE, UNSPECIFIED WHETHER ESOPHAGITIS PRESENT: ICD-10-CM

## 2024-02-28 RX ORDER — POLYETHYLENE GLYCOL 3350, SODIUM CHLORIDE, SODIUM BICARBONATE, POTASSIUM CHLORIDE 420; 11.2; 5.72; 1.48 G/4L; G/4L; G/4L; G/4L
POWDER, FOR SOLUTION ORAL
Qty: 4000 ML | Refills: 0 | Status: SHIPPED | OUTPATIENT
Start: 2024-02-28 | End: 2024-03-04

## 2024-02-28 NOTE — TELEPHONE ENCOUNTER
Ronda   Pt saw you in office last year 2/2023 and had orders for EGD/colon. Pt wanted to call back after having another surgery done last year. Pt now calling to schedule. No medical changes. Do you need to give new orders or use last years?  Thanks,  Suzanna    Anticoagulants: No  Diuretics: lisinopril-HCTZ  Diabetic Med's (PO/Injectables): No  Weight loss Med's: No  Iron/Herbal/Multivitamin Supplements (RX/OTC): yes  Marijuana/Vaping/CBD: No  Height & Weight:5'11.5\"/245 lbs  BMI: 33.7  Hx of Cardiac/CVA Issues/(MI/Stroke): No  Devices Pacemaker/Defibrillator/Stents: No  Respiratory Issues/Oxygen Use/TORI/COPD: No  Issues w/ Anesthesia: No    Symptoms (Y/N): No  Symptoms Details: N/A    Special Comments/Notes: Uses sildenafil for ED.    Please advise on orders and prep. Meds, allergies, and pharmacy verified with pt.    Thank you!

## 2024-02-28 NOTE — TELEPHONE ENCOUNTER
S/p left total hip replacement 5/2022  Okay to schedule procedure:    1. Schedule colonoscopy/egd with MAC w/ any MD [Diagnosis: crc screening, gerd, nausea, bloating, fhx gastric ca]     2.  bowel prep from pharmacy (split trilyte)     3. Hold sildenafil 72h prior to procedure     4. Read all bowel prep instructions carefully     5. AVOID seeds, nuts, popcorn, raw fruits and vegetables (cooked is okay) for 2-3 days before procedure     6. You will need to be tested for COVID within 72 hours of your procedure.  You will be contacted with instructions on how to do this.     >>>Please note: if you were prescribed Suprep for the bowel prep and it is too expensive or not covered by insurance, it is okay to substitute Trilyte (or any similar generic prep). This can be done by notifying the pharmacy or calling our office.

## 2024-02-29 NOTE — TELEPHONE ENCOUNTER
PPD-  Patient is scheduled for colonoscopy and egd on 3/4/24 with Dr. Cooper at [facility].   Thank you!

## 2024-02-29 NOTE — TELEPHONE ENCOUNTER
Scheduled for:  Colonoscopy [id number]   Provider Name:  Dr. Cooper   Date:  3/4/2024  Location:    [facility]   Sedation:  mac  Time:  1:15 (patient is aware arrival time is 12:15)  Prep:  trilyte   Meds/Allergies Reconciled?:  Physician reviewed   Diagnosis with codes:  crc screening z12.11 , gerd k21.9 , nausea r11.0, bloating r14.0, fhx gastric ca z80.0  Was patient informed to call insurance with codes (Y/N):  Yes, I confirmed [employer] insurance with the patient.   Referral sent?:  Referral was sent at the time of electronic surgical scheduling.  EM or [facility] notified?:  I sent an electronic request to Endo Scheduling and received a confirmation today.   Medication Orders:  This patient verbally confirmed that he is not taking:   Iron, blood thinners, BP meds, and is not diabetic   Not latex allergy, Not PCN allergy and does not have a pacemaker  Misc Orders:  I discussed the prep instructions with the patient which he verbally understood and is aware that I will mychart the instructions today.  Further instructions given by staff:

## 2024-03-04 ENCOUNTER — HOSPITAL ENCOUNTER (OUTPATIENT)
Facility: HOSPITAL | Age: 54
Setting detail: HOSPITAL OUTPATIENT SURGERY
Discharge: HOME | End: 2024-03-04
Attending: INTERNAL MEDICINE | Admitting: INTERNAL MEDICINE
Payer: COMMERCIAL

## 2024-03-04 ENCOUNTER — ANESTHESIA EVENT (OUTPATIENT)
Dept: ENDOSCOPY | Facility: HOSPITAL | Age: 54
End: 2024-03-04
Payer: COMMERCIAL

## 2024-03-04 ENCOUNTER — ANESTHESIA (OUTPATIENT)
Dept: ENDOSCOPY | Facility: HOSPITAL | Age: 54
End: 2024-03-04
Payer: COMMERCIAL

## 2024-03-04 VITALS
HEART RATE: 62 BPM | OXYGEN SATURATION: 100 % | TEMPERATURE: 98 F | RESPIRATION RATE: 13 BRPM | SYSTOLIC BLOOD PRESSURE: 139 MMHG | BODY MASS INDEX: 33.6 KG/M2 | WEIGHT: 240 LBS | HEIGHT: 71 IN | DIASTOLIC BLOOD PRESSURE: 89 MMHG

## 2024-03-04 DIAGNOSIS — Z12.11 SCREEN FOR COLON CANCER: ICD-10-CM

## 2024-03-04 DIAGNOSIS — K21.9 GASTROESOPHAGEAL REFLUX DISEASE, UNSPECIFIED WHETHER ESOPHAGITIS PRESENT: ICD-10-CM

## 2024-03-04 DIAGNOSIS — R14.0 BLOATING: ICD-10-CM

## 2024-03-04 DIAGNOSIS — Z80.0 FH: GASTRIC CANCER: ICD-10-CM

## 2024-03-04 DIAGNOSIS — R11.0 NAUSEA: ICD-10-CM

## 2024-03-04 PROCEDURE — 0DB78ZX EXCISION OF STOMACH, PYLORUS, VIA NATURAL OR ARTIFICIAL OPENING ENDOSCOPIC, DIAGNOSTIC: ICD-10-PCS | Performed by: INTERNAL MEDICINE

## 2024-03-04 PROCEDURE — 45385 COLONOSCOPY W/LESION REMOVAL: CPT | Performed by: INTERNAL MEDICINE

## 2024-03-04 PROCEDURE — 43239 EGD BIOPSY SINGLE/MULTIPLE: CPT | Performed by: INTERNAL MEDICINE

## 2024-03-04 PROCEDURE — 0DBH8ZX EXCISION OF CECUM, VIA NATURAL OR ARTIFICIAL OPENING ENDOSCOPIC, DIAGNOSTIC: ICD-10-PCS | Performed by: INTERNAL MEDICINE

## 2024-03-04 RX ORDER — LIDOCAINE HYDROCHLORIDE 10 MG/ML
INJECTION, SOLUTION EPIDURAL; INFILTRATION; INTRACAUDAL; PERINEURAL AS NEEDED
Status: DISCONTINUED | OUTPATIENT
Start: 2024-03-04 | End: 2024-03-04 | Stop reason: SURG

## 2024-03-04 RX ORDER — GLYCOPYRROLATE 0.2 MG/ML
INJECTION, SOLUTION INTRAMUSCULAR; INTRAVENOUS AS NEEDED
Status: DISCONTINUED | OUTPATIENT
Start: 2024-03-04 | End: 2024-03-04 | Stop reason: SURG

## 2024-03-04 RX ORDER — NALOXONE HYDROCHLORIDE 0.4 MG/ML
0.08 INJECTION, SOLUTION INTRAMUSCULAR; INTRAVENOUS; SUBCUTANEOUS ONCE AS NEEDED
Status: DISCONTINUED | OUTPATIENT
Start: 2024-03-04 | End: 2024-03-04

## 2024-03-04 RX ORDER — SODIUM CHLORIDE, SODIUM LACTATE, POTASSIUM CHLORIDE, CALCIUM CHLORIDE 600; 310; 30; 20 MG/100ML; MG/100ML; MG/100ML; MG/100ML
INJECTION, SOLUTION INTRAVENOUS CONTINUOUS
Status: DISCONTINUED | OUTPATIENT
Start: 2024-03-04 | End: 2024-03-04

## 2024-03-04 RX ADMIN — SODIUM CHLORIDE, SODIUM LACTATE, POTASSIUM CHLORIDE, CALCIUM CHLORIDE: 600; 310; 30; 20 INJECTION, SOLUTION INTRAVENOUS at 13:10:00

## 2024-03-04 RX ADMIN — GLYCOPYRROLATE 0.2 MG: 0.2 INJECTION, SOLUTION INTRAMUSCULAR; INTRAVENOUS at 13:25:00

## 2024-03-04 RX ADMIN — LIDOCAINE HYDROCHLORIDE 50 MG: 10 INJECTION, SOLUTION EPIDURAL; INFILTRATION; INTRACAUDAL; PERINEURAL at 13:29:00

## 2024-03-04 NOTE — ANESTHESIA POSTPROCEDURE EVALUATION
Patient: Denilson Hayes    Procedure Summary       Date: 03/04/24 Room / Location: [facility] ENDOSCOPY 04 / EM ENDOSCOPY    Anesthesia Start: 1325 Anesthesia Stop:     Procedures:       COLONOSCOPY      ESOPHAGOGASTRODUODENOSCOPY (EGD) Diagnosis:       Screen for colon cancer      Gastroesophageal reflux disease, unspecified whether esophagitis present      Nausea      Bloating      FH: gastric cancer      (gastric erythema, rule out h. pylori, gastric polyps, colon polyps, diverticulosis)    Surgeons: Sylvia Cooper MD Anesthesiologist: Frank Krueger CRNA    Anesthesia Type: MAC ASA Status: 2            Anesthesia Type: MAC    Vitals Value Taken Time   /76 03/04/24 1406   Temp 36.5C 03/04/24 1406   Pulse 79 03/04/24 1406   Resp 13 03/04/24 1406   SpO2 95% 03/04/24 1406       [facility] AN Post Evaluation:   Patient Evaluated in PACU  Patient Participation: complete - patient participated  Level of Consciousness: sleepy but conscious  Pain Score: 0  Pain Management: adequate  Airway Patency:patent  Dental exam unchanged from preop  Yes    Nausea/Vomiting: none  Cardiovascular Status: acceptable and stable  Respiratory Status: acceptable and room air      Frank Krueger CRNA  3/4/2024 2:06 PM

## 2024-03-04 NOTE — PRE-SEDATION ASSESSMENT
Physician Pre-Sedation Assessment    Pre-Sedation Assessment:    Sedation History: Airway Assessed    Cardiac: normal S1, S2  Respiratory: breath sounds clear bilaterally   Abdomen: soft, BS (+), non-tender    ASA Classification: 2. Patient with mild systemic disease    Plan: MAC sedation

## 2024-03-04 NOTE — H&P
Pre Procedure History & Physical Examination    Patient Name: Denilson Hayes  MRN: [id number]  [facility]: [id number]  YOB: 1970    Diagnosis: crc screening, gerd, nausea, bloating, fhx gastric ca     Medications Prior to Admission   Medication Sig Dispense Refill Last Dose    cyclobenzaprine 5 MG Oral Tab Take 1 tablet (5 mg total) by mouth nightly as needed. AT BEDTIME       pantoprazole 40 MG Oral Tab EC Take 1 tablet (40 mg total) by mouth daily as needed. 90 tablet 3     Sildenafil Citrate 100 MG Oral Tab Take 1 tablet (100 mg total) by mouth daily as needed for Erectile Dysfunction. 18 tablet 3     lisinopril-hydroCHLOROthiazide 20-25 MG Oral Tab Take 1 tablet by mouth daily. 90 tablet 2 3/1    multivitamin with minerals Oral Tab Take 1 tablet by mouth daily. 30 tablet 0     PEG 3350-KCl-Na Bicarb-NaCl (TRILYTE) 420 g Oral Recon Soln Take prep as directed by gastro office. May substitute with Trilyte/generic equivalent if needed. 4000 mL 0     PEG 3350-KCl-Na Bicarb-NaCl (TRILYTE) 420 g Oral Recon Soln Take prep as directed by gastro office. May substitute with Trilyte/generic equivalent if needed. (Patient not taking: Reported on 2/12/2024) 4000 mL 0      Current Facility-Administered Medications   Medication Dose Route Frequency    lactated ringers infusion   Intravenous Continuous       Allergies: No Known Allergies    Past Medical History:   Diagnosis Date    Alcohol withdrawal (HCC) 05/01/2019    Alcohol withdrawal syndrome without complication (HCC) 05/01/2019    Anxiety state     Carpal tunnel syndrome, left     Depression     Disorder of liver     Essential hypertension     High blood pressure     Hypertension     Migraines     MRSA (methicillin resistant Staphylococcus aureus) 2011    MRSA groin abcess - hospitalized; IV antibiotics; debridement    Pneumonia 2010    left lower lobe - out pt. antibiotics    Rheumatoid arthritis (HCC)     Still's disease (HCC)     Still's disease (HCC)     Still's  disease (HCC)     Tinnitus     Transaminitis 2019    Visual impairment     going to see an eye      Past Surgical History:   Procedure Laterality Date    OTHER SURGICAL HISTORY      hernia repair     OTHER SURGICAL HISTORY      right thigh cellulitis    WRIST ARTHROSCOP,RELEASE XVERS LIG Left 3-3-17    endoscopic carpal tunnel release     Family History   Problem Relation Age of Onset    Diabetes Father     Hypertension Father     Cancer Mother         cervical    Diabetes Mother     Hypertension Mother     Depression Sister     Hypertension Brother      Social History     Tobacco Use    Smoking status: Former     Years: 10     Types: Cigarettes     Quit date: 2012     Years since quittin.6    Smokeless tobacco: Former     Quit date: 2012    Tobacco comments:     1 pk would last a week   Substance Use Topics    Alcohol use: Not Currently     Alcohol/week: 0.0 standard drinks of alcohol     Comment: 1 x a week         ROS:   CONSTITUTIONAL:  negative for fevers, rigors  EYES:  negative for diplopia   RESPIRATORY:  negative for severe shortness of breath  CARDIOVASCULAR:  negative for crushing sub-sternal chest pain  GASTROINTESTINAL:  see HPI  GENITOURINARY:  negative for dysuria or gross hematuria  INTEGUMENT/BREAST:  SKIN:  negative for jaundice   ALLERGIC/IMMUNOLOGIC:  negative for hay fever  ENDOCRINE:  negative for cold intolerance and heat intolerance  MUSCULOSKELETAL:  negative for joint effusion/severe erythema  BEHAVIOR/PSYCH:  negative for psychotic behavior      PHYSICAL EXAM:   /83 (BP Location: Left arm)   Pulse 64   Temp 97.8 °F (36.6 °C) (Oral)   Resp 14   Ht 5' 11\" (1.803 m)   Wt 240 lb (108.9 kg)   SpO2 98%   BMI 33.47 kg/m²       Gen: Patient appears comfortable and in no acute discomfort  HEENT: the sclera appears anicteric, oropharynx clear, mucus membranes appear moist  CV: regular rate and rhythm, the extremities are warm and well perfused   Lung:  Moves air well; No labored breathing  Abdomen: soft, non-tender exam in all quadrants without rigidity or guarding, non-distended, no abnormal bowel sounds noted, no masses are palpated  Skin: No jaundice  Ext: no cyanosis, clubbing or edema is evident.   Neuro: Alert and interactive, and gross movements of extremities normal    I have discussed the risks and benefits and alternatives of the procedure with the patient/family.  They understand and agree to proceed with plan of care.   I have reviewed recent H&P/clinic notes  Sylvia Cooper MD  [facility] - Gastroenterology  3/4/2024  1:14 PM

## 2024-03-04 NOTE — DISCHARGE INSTRUCTIONS
Home Care Instructions for Colonoscopy and/or Gastroscopy with Sedation    Diet:  - Resume your regular diet as tolerated unless otherwise instructed.  - Start with light meals to minimize bloating.  - Do not drink alcohol today.    Medication:  - If you have questions about resuming your normal medications, please contact your Primary Care Physician.    Activities:  - Take it easy today. Do not return to work today.  - Do not drive today.  - Do not operate any machinery today (including kitchen equipment).    Colonoscopy:  - You may notice some rectal \"spotting\" (a little blood on the toilet tissue) for a day or two after the exam. This is normal.  - If you experience any rectal bleeding (not spotting), persistent tenderness or sharp severe abdominal pains, oral temperature over 100 degrees Fahrenheit, light-headedness or dizziness, or any other problems, contact your doctor.    Gastroscopy:  - You may have a sore throat for 2-3 days following the exam. This is normal. Gargling with warm salt water (1/2 tsp salt to 1 glass warm water) or using throat lozenges will help.  - If you experience any sharp pain in your neck, abdomen or chest, vomiting of blood, oral temperature over 100 degrees Fahrenheit, light-headedness or dizziness, or any other problems, contact your doctor.    **If unable to reach your doctor, please go to the [facility] Emergency Room**    - Your referring physician will receive a full report of your examination.  - If you do not hear from your doctor's office within two weeks of your biopsy, please call them for your results.    You may be able to see your laboratory results in [facility] between 4 and 7 business days.  In some cases, your physician may not have viewed the results before they are released to [facility].  If you have questions regarding your results contact the physician who ordered the test/exam by phone or via [facility] by choosing \"Ask a Medical Question.\"

## 2024-03-04 NOTE — OPERATIVE REPORT
ESOPHAGOGASTRODUODENOSCOPY (EGD) & COLONOSCOPY REPORT    Denilson Hayes     12/15/1970 Age 53 year old   PCP Emanuel Cantu MD Endoscopist Sylvia Cooper MD     Date of procedure: 24    Procedure: EGD w/biopsy and cold biopsy polypectomy & Colonoscopy w/cold snare polypectomy and cold biopsy polypectomy    Pre-operative diagnosis: GERD, nausea, bloating, screening colonoscopy    Post-operative diagnosis: gastric polyps, gastric erythema, colon polyps, diverticulosis and hemorrhoids     Medications: MAC sedation    Withdrawal time: 14 minutes    Complications: none    Procedure: Informed consent was obtained from the patient after the risks of the procedure were discussed, including but not limited to bleeding, perforation, aspiration, infection, or possibility of a missed lesion. We discussed the risks/benefits and alternatives to this procedure, as well as the planned sedation. EGD procedure: The patient was placed in the left lateral decubitus position and begun on continuous blood pressure pulse oximetry and EKG monitoring and this was maintained throughout the procedure. Once an adequate level of sedation was obtained a bite block was placed. Then the lubricated tip of the [id number] diagnostic video upper endoscope was inserted and advanced using direct visualization into the posterior pharynx and ultimately into the esophagus. Colonoscopy procedure: Once an adequate level of sedation was obtained a digital rectal exam was completed. Then the lubricated tip of the [id number] diagnostic video colonoscope was inserted and advanced without difficulty to the cecum using the CO2 insufflation technique. The cecum was identified by localizing the trifold, the appendix and the ileocecal valve. A routine second examination of the cecum/ascending colon was performed. Withdrawal was begun with thorough washing and careful examination of the colonic walls and folds. Photodocumentation was obtained. The  bowel prep was good. Views of the colon were good with washing. I then carefully withdrew the instrument from the patient who tolerated the procedure well.     Complications: None    EGD findings:      1. Esophagus: The squamocolumnar junction was noted at 40 cm and appeared regular. The GE junction was noted at 40 cm from the incisors. NO significant hiatal hernia. The esophageal mucosa appeared normal. There was no evidence of esophagitis, stricture or endoscopic evidence of Cheney's esophagus.  2. Stomach: The stomach distended normally. Normal rugal folds were seen. The pylorus was patent. The gastric mucosa appeared erythematous so biopsied to rule out HPylori. Polyps (likely fundic gland polyps) in the stomach seen, 2-6mm, two removed and retrieved.  Retroflexion revealed a normal fundus and a normal cardia.   3. Duodenum: The duodenal mucosa appeared normal in the 1st and 2nd portion of the duodenum.     Colonoscopy findings:    1. YVONNE: normal rectal tone, no masses palpated.   2. 2 polyp(s) noted as follows:      A. 7 mm polyp in the cecum; flat morphology; cold snare polypectomy and retrieved.      B. 4 mm polyp in the cecum; flat morphology; cold biopsy polypectomy and retrieved.  3. Terminal ileum: the visualized mucosa appeared normal.  4. The colonic mucosa throughout the colon showed normal vascular pattern, without evidence of angioectasias or inflammation.   5. Diverticulosis: left sided.  6. A retroflexed view of the rectum revealed hemorrhoids.      Impression:  Avoid NSAIDs    Recommend:  Await pathology, likely repeat colonoscopy in 5 years pending pathology. The interval for the next colonoscopy will be determined after reviewing pathology. If new signs or symptoms develop, colonoscopy may need to be repeated sooner.   High fiber diet.  Monitor for blood in the stool. If having more than just tinge of blood, call office or go to the ER.  Here are some reflux precautions:   - Avoid trigger  foods  - Anti-reflux measures: raising the head of the bed at night, avoiding tight clothing or belts, avoiding eating late at night and not lying down shortly after mealtime and achieving weight loss   - Avoid NSAID's, caffeine, peppermints, alcohol, tobacco and foods that incite symptoms     >>>If tissue was obtained and you have not received your pathology results either by phone or letter within 2 weeks, please call our office at [phone].    Specimens: gastric biopsies, gastric erythema, cecal polyps

## 2024-03-04 NOTE — ANESTHESIA PREPROCEDURE EVALUATION
Anesthesia PreOp Note    HPI:     Denilson Hayes is a 53 year old male who presents for preoperative consultation requested by: Sylvia Cooper MD    Date of Surgery: 3/4/2024    Procedure(s):  COLONOSCOPY  Indication: Screen for colon cancer / Gastroesophageal reflux disease, unspecified whether esophagitis present / Nausea/ Bloating / FH: gastric cancer    Relevant Problems   No relevant active problems       NPO:      Last solid intake: 3/3/24 at 9 AM      Last clear liquid intake: 3/4/24 at 9 AM             History Review:  Patient Active Problem List    Diagnosis Date Noted    History of 2019 novel coronavirus disease (COVID-19) 02/08/2021    Anxiety and depression 05/13/2019    Fatty liver 05/13/2019    Chronic pain of left knee 03/01/2016    Carpal tunnel syndrome 02/20/2015    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 antibiotics    Rheumatoid arthritis (HCC)     Still's disease (HCC)     Still's disease (HCC)     Still's disease (HCC)     Tinnitus     Transaminitis 05/01/2019    Visual impairment     going to see an eye dr       Past Surgical History:   Procedure Laterality Date    OTHER SURGICAL HISTORY  1991    hernia repair     OTHER SURGICAL HISTORY  2011    right thigh cellulitis    WRIST ARTHROSCOP,RELEASE XVERS LIG Left 3-3-17    endoscopic carpal tunnel release       Medications Prior to Admission   Medication Sig Dispense Refill Last  Dose    cyclobenzaprine 5 MG Oral Tab Take 1 tablet (5 mg total) by mouth nightly as needed. AT BEDTIME       pantoprazole 40 MG Oral Tab EC Take 1 tablet (40 mg total) by mouth daily as needed. 90 tablet 3     Sildenafil Citrate 100 MG Oral Tab Take 1 tablet (100 mg total) by mouth daily as needed for Erectile Dysfunction. 18 tablet 3     lisinopril-hydroCHLOROthiazide 20-25 MG Oral Tab Take 1 tablet by mouth daily. 90 tablet 2     multivitamin with minerals Oral Tab Take 1 tablet by mouth daily. 30 tablet 0     PEG 3350-KCl-Na Bicarb-NaCl (TRILYTE) 420 g Oral Recon Soln Take prep as directed by gastro office. May substitute with Trilyte/generic equivalent if needed. 4000 mL 0     PEG 3350-KCl-Na Bicarb-NaCl (TRILYTE) 420 g Oral Recon Soln Take prep as directed by gastro office. May substitute with Trilyte/generic equivalent if needed. (Patient not taking: Reported on 2024) 4000 mL 0      No current [facility]-ordered facility-administered medications on file.     No current [facility]-ordered outpatient medications on file.       No Known Allergies    Family History   Problem Relation Age of Onset    Diabetes Father     Hypertension Father     Cancer Mother         cervical    Diabetes Mother     Hypertension Mother     Depression Sister     Hypertension Brother      Social History     Socioeconomic History    Marital status: Single   Tobacco Use    Smoking status: Former     Years: 10     Types: Cigarettes     Quit date: 2012     Years since quittin.6    Smokeless tobacco: Former     Quit date: 2012    Tobacco comments:     1 pk would last a week   Vaping Use    Vaping Use: Former    Quit date: 2020   Substance and Sexual Activity    Alcohol use: Not Currently     Alcohol/week: 0.0 standard drinks of alcohol     Comment: 1 x a week    Drug use: No    Sexual activity: Not Currently     Partners: Female   Other Topics Concern    Caffeine Concern Yes     Comment: coffee/tea - sometimes       Available  pre-op labs reviewed.  Lab Results   Component Value Date    WBC 5.3 02/13/2024    RBC 4.82 02/13/2024    HGB 13.7 02/13/2024    HCT 41.8 02/13/2024    MCV 86.7 02/13/2024    MCH 28.4 02/13/2024    MCHC 32.8 02/13/2024    RDW 12.4 02/13/2024     02/13/2024     Lab Results   Component Value Date     02/13/2024    K 4.5 02/13/2024     02/13/2024    CO2 31 02/13/2024    BUN 18 02/13/2024    CREATSERUM 0.68 (L) 02/13/2024    GLU 93 02/13/2024    CA 9.3 02/13/2024          Vital Signs:  Body mass index is 33.47 kg/m².   height is 1.803 m (5' 11\") and weight is 108.9 kg (240 lb).   Vitals:    03/01/24 0954   Weight: 108.9 kg (240 lb)   Height: 1.803 m (5' 11\")        Anesthesia Evaluation     Patient summary reviewed and Nursing notes reviewed    No history of anesthetic complications   Airway   Mallampati: I  TM distance: >3 FB  Neck ROM: limited  Dental      Comment: Denies any chipped, loose, missing teeth    Pulmonary - negative ROS    breath sounds clear to auscultation  Cardiovascular   Exercise tolerance: good  (+) hypertension well controlled  (-) pacemaker, past MI, CAD, CABG/stent, dysrhythmias, angina    ECG reviewed  Rhythm: regular  Rate: normal    Neuro/Psych    (+)  neuromuscular disease, headaches, anxiety/panic attacks,  depression    (-) seizures, TIA    GI/Hepatic/Renal    (+) GERD well controlled, liver disease, bowel prep    Endo/Other    (+) arthritis  Abdominal                  Anesthesia Plan:   ASA:  2  Plan:   MAC  Informed Consent Plan and Risks Discussed With:  Patient  Discussed plan with:  CRNA and surgeon      I have informed Denilson Hayes and/or legal guardian or family member of the nature of the anesthetic plan, benefits, risks including possible dental damage if relevant, major complications, and any alternative forms of anesthetic management.   All of the patient's questions were answered to the best of my ability. The patient desires the anesthetic management as  planned.  Frank Krueger CRNA  3/4/2024 12:25 PM  Present on Admission:  **None**

## 2024-03-06 ENCOUNTER — TELEPHONE (OUTPATIENT)
Facility: CLINIC | Age: 54
End: 2024-03-06

## 2024-03-06 NOTE — TELEPHONE ENCOUNTER
5  year colonoscopy recall entered. Health maintenance updated.    Colonoscopy done on 3/4/24 and next due on 3/4/29

## 2024-03-06 NOTE — TELEPHONE ENCOUNTER
----- Message from Sylvia Cooper MD sent at 3/5/2024  9:58 AM CST -----  GI staff: please place recall for colonoscopy in 5 years

## 2024-05-15 NOTE — TELEPHONE ENCOUNTER
Patient is requesting a refill for sildenafil. Patient is requesting a prescription for 30 tablets instead of 18. Patient's insurance does not cover medication and patient is able to pay less for 30 tablets with a goodrx coupon. Please advise     [id number] - [place], [place] - [address] [place] [address]      Medication Quantity Refills Start End   Sildenafil Citrate 100 MG Oral Tab 18 tablet 3 2/12/2024 --   Sig:   Take 1 tablet (100 mg total) by mouth daily as needed for Erectile Dysfunction.     Route:   Oral     Note to Pharmacy:   Patient will be using a goodrx coupon.     PRN Reason(s):   Erectile Dysfunction     Order #:   [id number]

## 2024-05-17 NOTE — TELEPHONE ENCOUNTER
Refill passed per [facility] protocol.        Hawa Reina2 days ago     GR  Patient is requesting a refill for sildenafil. Patient is requesting a prescription for 30 tablets instead of 18. Patient's insurance does not cover medication and patient is able to pay less for 30 tablets with a [employer] coupon. Please advise         Requested Prescriptions   Pending Prescriptions Disp Refills    Sildenafil Citrate 100 MG Oral Tab 30 tablet 1     Sig: Take 1 tablet (100 mg total) by mouth daily as needed for Erectile Dysfunction.       Genitourinary Medications Passed - 5/17/2024 12:18 PM        Passed - Patient does not have pulmonary hypertension on problem list        Passed - In person appointment or virtual visit in the past 12 mos or appointment in next 3 mos     Recent Outpatient Visits              3 months ago Routine physical examination    [facility], Emanuel Colbert MD    Office Visit    1 year ago Pre-op exam    [facility], Emanuel Colbert MD    Office Visit    1 year ago Gastroesophageal reflux disease, unspecified whether esophagitis present    [facility] Ronda Sharpe APRN    Office Visit    1 year ago Routine physical examination    [facility], Emanuel Colbert MD    Office Visit    1 year ago Fever, unspecified fever cause    [facility], Jay Boyd MD    Office Visit                         Recent Outpatient Visits              3 months ago Routine physical examination    [facility], Emanuel Colbert MD    Office Visit    1 [facility]    [facility]    1 year ago Gastroesophageal reflux disease, unspecified whether esophagitis  present    [facility], [facility] Ronda Sharpe APRN    Office Visit    1 year ago Routine physical examination    [facility], [facility], Emanuel Colbert MD    Office Visit    1 year ago Fever, unspecified fever cause    [facility], [facility], Jay Boyd MD    Office Visit

## 2024-05-17 NOTE — TELEPHONE ENCOUNTER
Sildenafil 100mg: Patient wants 30 tablets instead of 18 tablets, routing to run through protocol.

## 2024-05-19 RX ORDER — SILDENAFIL 100 MG/1
100 TABLET, FILM COATED ORAL
Qty: 30 TABLET | Refills: 1 | Status: SHIPPED | OUTPATIENT
Start: 2024-05-19

## 2024-05-28 ENCOUNTER — NURSE TRIAGE (OUTPATIENT)
Dept: INTERNAL MEDICINE CLINIC | Facility: CLINIC | Age: 54
End: 2024-05-28

## 2024-05-28 NOTE — TELEPHONE ENCOUNTER
Action Requested: Summary for Provider     []  Critical Lab, Recommendations Needed  [] Need Additional Advice  [x]   FYI    []   Need Orders  [] Need Medications Sent to Pharmacy  []  Other     SUMMARY: Patient stated that he just spoke to the on call doctor and he suggested that patient to go to the emergency room. Patient reluctant to go to the emergency room. Wanted to be seen in the office. Indicated that he has been having abdominal pain, black stools, shaking, lightheaded, legs feel weak, and nauseated. No other symptoms. Advised patient that due to his symptoms that the emergency room is the appropriate place for him to be evaluated since he would need to have blood work done and imaging done to rule out any internal bleeding and they would not be able to get that done in an office visit. Advised patient that an office visit would delay his care. Patient verbalized understanding and agreed to go to the emergency room.  Reason for call: Abdominal Pain (Abdominal pain, black stools, weak, shaking, lightheaded, legs weak, nausea)  Onset: Data Unavailable    Reason for Disposition   Black or tarry bowel movements  (Exception: Chronic-unchanged black-grey BMs AND is taking iron pills or Pepto-Bismol.)    Protocols used: Abdominal Pain - Male-A-[place]     RN attempted to reach patient via phone. No answer. RN left message requesting patient callback to schedule follow up appointment.

## 2024-05-28 NOTE — TELEPHONE ENCOUNTER
Patient calling back. States he has been waiting for 2 hours to be seen in an ER up [place]. He is very frustrated. I advised him reasoning of ER visit versus office visit based on RN's note below. He will go to [place] ER instead. I advised that online the estimated wait time is 41 minutes. Asking RN to call [place] ER to tell them he is on the way. I advised that I would call but it does not hold his spot in line.He verbalized understanding.    Called [facility] and advised that patient is on the way. Information was taken and will wait for patient.     Routing to RN triage for ER follow up.

## 2024-05-29 ENCOUNTER — TELEPHONE (OUTPATIENT)
Facility: CLINIC | Age: 54
End: 2024-05-29

## 2024-05-29 RX ORDER — SUCRALFATE 1 G/1
1 TABLET ORAL
Qty: 90 TABLET | Refills: 0 | Status: SHIPPED | OUTPATIENT
Start: 2024-05-29 | End: 2024-06-28

## 2024-05-29 NOTE — TELEPHONE ENCOUNTER
Went to [facility] yesterday. Per note from ER:    Patricia Hay, ELIDA - 05/28/2024 11:17 AM CDT  Formatting of this note might be different from the original.  charge nurse:  Was informed at this time that pt has left ED prior to being seen by MD. unable to speak to pt prior to them leaving  Electronically signed by Patricia Hay RN at 05/28/2024 11:17 AM CDT

## 2024-05-29 NOTE — TELEPHONE ENCOUNTER
Ronda,   Pt called office, was seen at [facility] yesterday for \"black stool, abdominal pain, shakiness and dizziness\". Pt still not feeling well; states labs and US were normal at OSH. I am not able to access the ER report, I asked pt to call their medical records to either give access or fax reports to us.    Pt states he ate spicy food on Saturday, took pepto bismol over the weekend. Reports fatigue/weakness, abdominal pain, lightheadedness, and an episode of watery diarrhea yesterday. Had nightsweats x 1. He mentioned that the US was painful, unsure if r/t gallbladder. + acid taste in throat. Denies vomiting, fevers. One mushy stool today. No abdominal pain at this time but c/o \"soreness\" in middle of stomach today.    Pt takes pantoprazole daily. ER prescribed sucralfate TID x 7 days. Are you able to see ER records from [facility]? Please advise.  Thanks,  Suzanna MELCHOR Working on getting reports faxed to us.

## 2024-05-29 NOTE — TELEPHONE ENCOUNTER
RN called and spoke to pt. Pt scheduled for clinic appt on 6/10/24. Date, time, and location verified with pt. Pt states he takes several supplements; informed him APN note recommended stopping any supplements at this time. Pt unsure about stopping but was agreeable to holding at this time. Pt verbalized understanding and had no further needs.    Your Appointments      Monday Diann 10, 2024 2:00 PM  Follow Up Visit with AYANNA Crabtree  [facility] ([facility]) [facility] [address] [id number] [phone]                Request for ER notes/imaging/lab records from 5/29/24 faxed to [facility].   Fax #[phone]

## 2024-05-29 NOTE — TELEPHONE ENCOUNTER
I faxed records request to [facility] at number below.  Put on face sheet in big letters that records needed for continuity of care.

## 2024-05-29 NOTE — TELEPHONE ENCOUNTER
Patient called to inform Dr. Cooper's nurses that he was able to get the fax number for his medical records as well as the direct number. [phone].   Fax # [phone]

## 2024-05-29 NOTE — TELEPHONE ENCOUNTER
I contacted the pt -  Onset of symptoms after using supplements/mvi + iron in am on an empty stomach and eating more spicy food.   Onset of symptoms while on pantoprazole 40 mg/daily and reflux was well controlled.     No nsaids. no tobacco. no alcohol.  He had bm today and was not dark/black.  No brbpr  Had watery bm yesterday  Mushy bm today.  Has had nausea, but no vomiting.     He was prescribed sucralfate. Has been taking therapy three times daily with some improvement in symptoms, but still does not feel like himself.    He says has had shakiness in his hands.     Recommend:  Stop supplements for now  Fibercon or citrucel once daily  Continue pantoprazole 40 mg/daily  Continue sucralfate x next 6 weeks  [place] diet and advance as tolerated, avoid common reflux triggers  hydrate  F/u w/ pcp  Er if condition decline    Await records from ed.  All recommendations reviewed with pt and he is in agreement with the plan.      Nursing:  Okay to use res 24 in next few weeks to f/u with me. Could you just let him know date/time?        Thanks,  Ronda

## 2024-05-30 NOTE — TELEPHONE ENCOUNTER
Patient returned call. Patient states he went to [facility] emergency room.  Had multiple tests done and did not find anything that is causing his stomach issues.  Patient states GI doctor is in process of getting results.  Patient states he needs to follow up with Dr. Cantu for other issues.  Patient has been experiencing symptoms of lightheadedness, shakes, legs feeling like jello.  Patient overall feels better today, but no 100%.  Offered appointment for tomorrow morning. Patient states he already missed 2 days of work and cannot come in the morning.  Patient asking to be seen next week at 2p or after.  Declined other providers. Only wants to see Dr Cantu.  Please advise if we can add Patient to your schedule at 2p or after?

## 2024-05-30 NOTE — TELEPHONE ENCOUNTER
OK to put into any open slot (Res 24, etc). Let me know if there are no openings that work for the patient.

## 2024-05-30 NOTE — TELEPHONE ENCOUNTER
Patient contacted. Verified name and date of birth.  Appointment scheduled  Future Appointments   Date Time Provider Department Center   6/10/2024  2:00 PM Ronda Sharpe APRN [id number]   6/12/2024  2:20 PM [facility]

## 2024-06-03 NOTE — TELEPHONE ENCOUNTER
Received confirmation that fax failed transmission. RN re-faxed to medical records at [facility].  Fax #[phone]

## 2024-06-12 ENCOUNTER — OFFICE VISIT (OUTPATIENT)
Dept: INTERNAL MEDICINE CLINIC | Facility: CLINIC | Age: 54
End: 2024-06-12

## 2024-06-12 VITALS
SYSTOLIC BLOOD PRESSURE: 142 MMHG | HEART RATE: 67 BPM | BODY MASS INDEX: 34.91 KG/M2 | OXYGEN SATURATION: 96 % | DIASTOLIC BLOOD PRESSURE: 82 MMHG | WEIGHT: 249.38 LBS | RESPIRATION RATE: 17 BRPM | HEIGHT: 71 IN

## 2024-06-12 DIAGNOSIS — R11.0 NAUSEA: ICD-10-CM

## 2024-06-12 DIAGNOSIS — Z82.49 FAMILY HISTORY OF BRAIN ANEURYSM: ICD-10-CM

## 2024-06-12 DIAGNOSIS — R51.9 INCREASED SEVERITY OF HEADACHES: Primary | ICD-10-CM

## 2024-06-12 PROCEDURE — 3079F DIAST BP 80-89 MM HG: CPT | Performed by: INTERNAL MEDICINE

## 2024-06-12 PROCEDURE — 3008F BODY MASS INDEX DOCD: CPT | Performed by: INTERNAL MEDICINE

## 2024-06-12 PROCEDURE — 99214 OFFICE O/P EST MOD 30 MIN: CPT | Performed by: INTERNAL MEDICINE

## 2024-06-12 PROCEDURE — 3077F SYST BP >= 140 MM HG: CPT | Performed by: INTERNAL MEDICINE

## 2024-06-12 NOTE — PROGRESS NOTES
HPI:    Patient ID: Denilson Hayes is a 53 year old male.    HPI  Patient is here for follow-up on emergency room visit on May 28 at [facility].  We have access to some of the evaluation.  EKG was normal.  He did not stay there for long; he left before being seen due to 5 hour wait. Went ot [facility].    Issues with episodes of shakes and stomach pain. Had black, tarry stool. He has episodes of lightheaded, nausea, hands shaking. He now has cut back on spicy foods. He gets dizzy if standing up. Going on for a few months. In ER, did labs- blood and urine, US test. Pt had EGD and colonoscopy on Mar 4; showed gastric polyps, gastric erythema, colon polyps, diverticulosis. ALso having headaches. He was told the labs all were normal in the ER. Overall, he feels better. The stomach is better with cutting out spicy foods and the addition of sucralfate. He had been on pantoprazole long term. He stopped taking supplements on an empty stomach. No EtOH or tobacco in about 5 years. No drug use. He is still going to the gym. Not checking BP. He is getting more frequent and intense headaches. [place] has felt nauseous and headache; did not sleep well last night. Strong family history of aneurysms.     Patient Active Problem List   Diagnosis    Adult-onset Still's disease (HCC)    Avascular necrosis of hip (HCC)    Essential hypertension    Carpal tunnel syndrome    Chronic pain of left knee    Anxiety and depression    Fatty liver    History of 2019 novel coronavirus disease (COVID-19)          HISTORY:  Past Medical History:    Alcohol withdrawal (HCC)    Alcohol withdrawal syndrome without complication (HCC)    Anxiety state    Carpal tunnel syndrome, left    Depression    Disorder of liver    Essential hypertension    High blood pressure    Hypertension    Migraines    MRSA (methicillin resistant Staphylococcus aureus)    MRSA groin abcess - hospitalized; IV antibiotics; debridement    Pneumonia    left lower lobe - out pt.  antibiotics    Rheumatoid arthritis (HCC)    Still's disease (HCC)    Still's disease (HCC)    Still's disease (HCC)    Tinnitus    Transaminitis    Visual impairment    going to see an eye dr      Past Surgical History:   Procedure Laterality Date    Colonoscopy  2024    COLONOSCOPY with polypectomies    Colonoscopy N/A 3/4/2024    Procedure: COLONOSCOPY;  Surgeon: Sylvia Cooper MD;  [facility]: [facility] ENDOSCOPY    Egd  2024    ESOPHAGOGASTRODUODENOSCOPY with biopsies and polypectomy    Other surgical history  1991    hernia repair     Other surgical history  2011    right thigh cellulitis    Wrist arthroscop,release xvers lig Left 2017    endoscopic carpal tunnel release      Family History   Problem Relation Age of Onset    Diabetes Father     Hypertension Father     Cancer Mother         cervical    Diabetes Mother     Hypertension Mother     Depression Sister     Hypertension Brother       Social History     Socioeconomic History    Marital status: Single   Tobacco Use    Smoking status: Former     Current packs/day: 0.00     Types: Cigarettes     Start date: 2002     Quit date: 2012     Years since quittin.8     Passive exposure: Never    Smokeless tobacco: Former     Quit date: 2012    Tobacco comments:     1 pk would last a week   Vaping Use    Vaping status: Former    Quit date: 2020   Substance and Sexual Activity    Alcohol use: Not Currently     Alcohol/week: 0.0 standard drinks of alcohol     Comment: 1 x a week    Drug use: No    Sexual activity: Not Currently     Partners: Female   Other Topics Concern    Caffeine Concern Yes     Comment: coffee/tea - sometimes     Social Determinants of Health     Food Insecurity: No Food Insecurity (2023)    [facility]    Hunger Vital Sign     Worried About Running Out of Food in the Last Year: Never true     Ran Out of Food in the Last Year: Never true    Transportation Needs: No Transportation Needs (5/9/2023)    Received from [facility], [facility] - Transportation     Lack of Transportation (Medical): No     Lack of Transportation (Non-Medical): No          Review of Systems          Current Outpatient Medications   Medication Sig Dispense Refill    sucralfate 1 g Oral Tab Take 1 tablet (1 g total) by mouth 3 (three) times daily before meals. 90 tablet 0    Sildenafil Citrate 100 MG Oral Tab Take 1 tablet (100 mg total) by mouth daily as needed for Erectile Dysfunction. 30 tablet 1    pantoprazole 40 MG Oral Tab EC Take 1 tablet (40 mg total) by mouth daily as needed. 90 tablet 3    lisinopril-hydroCHLOROthiazide 20-25 MG Oral Tab Take 1 tablet by mouth daily. 90 tablet 2    multivitamin with minerals Oral Tab Take 1 tablet by mouth daily. 30 tablet 0    cyclobenzaprine 5 MG Oral Tab Take 1 tablet (5 mg total) by mouth nightly as needed. AT BEDTIME (Patient not taking: Reported on 6/12/2024)       Allergies:No Known Allergies     PHYSICAL EXAM:   /82   Pulse 67   Resp 17   Ht 5' 11\" (1.803 m)   Wt 249 lb 6 oz (113.1 kg)   SpO2 96%   BMI 34.78 kg/m²      Physical Exam  Constitutional:       Appearance: Normal appearance. He is well-developed.   HENT:      Nose: Nose normal.      Mouth/Throat:      Pharynx: No oropharyngeal exudate or posterior oropharyngeal erythema.   Eyes:      Conjunctiva/sclera: Conjunctivae normal.   Neck:      Vascular: No carotid bruit.   Cardiovascular:      Rate and Rhythm: Normal rate and regular rhythm.      Pulses: Normal pulses.      Heart sounds: Normal heart sounds. No murmur heard.  Pulmonary:      Effort: Pulmonary effort is normal.      Breath sounds: Normal breath sounds. No wheezing or rales.   Abdominal:      General: Bowel sounds are normal.      Palpations: Abdomen is soft. There is no mass.      Tenderness: There is no abdominal tenderness.   Musculoskeletal:       Right lower leg: No edema.      Left lower leg: No edema.   Lymphadenopathy:      Cervical: No cervical adenopathy.   Skin:     General: Skin is warm and dry.      Findings: No rash.   Neurological:      General: No focal deficit present.      Mental Status: He is alert.   Psychiatric:         Mood and Affect: Mood normal.         Behavior: Behavior normal.         Thought Content: Thought content normal.          Wt Readings from Last 6 Encounters:   06/12/24 249 lb 6 oz (113.1 kg)   03/01/24 240 lb (108.9 kg)   02/12/24 245 lb (111.1 kg)   04/19/23 240 lb (108.9 kg)   02/09/23 246 lb (111.6 kg)   02/08/23 246 lb (111.6 kg)             ASSESSMENT/PLAN:   1. Increased severity of headaches  Patient with onset of episodes as documented above including increasing frequency and intensity of headaches.  Also with GI symptoms.  Evaluation done at outside emergency room was unremarkable as per patient.  We do not have the printed copies with those.  They are not available through the EMR.  Strong family history with multiple first-degree relatives having brain aneurysms.  Due to this combination of issues, we will order MRI of the brain for better evaluation.  Hold off on any additional blood tests or GI testing.  - MRI BRAIN (W+WO) (CPT=70553); Future    2. Family history of brain aneurysm  As above.  - MRI BRAIN (W+WO) (CPT=70553); Future    3. Nausea  As above.  - MRI BRAIN (W+WO) (CPT=70553); Future         Meds This Visit:  Requested Prescriptions      No prescriptions requested or ordered in this encounter       Imaging & Referrals:  None         Emanuel Cantu MD

## 2024-06-19 RX ORDER — LISINOPRIL AND HYDROCHLOROTHIAZIDE 25; 20 MG/1; MG/1
1 TABLET ORAL DAILY
Qty: 90 TABLET | Refills: 3 | Status: SHIPPED | OUTPATIENT
Start: 2024-06-19

## 2024-06-19 NOTE — TELEPHONE ENCOUNTER
Please review.  Protocol failed / Has no protocol.     Requested Prescriptions   Pending Prescriptions Disp Refills    LISINOPRIL-HYDROCHLOROTHIAZIDE 20-25 MG Oral Tab [Pharmacy Med Name: LISINOPRIL-HCTZ 20/25MG TABLETS] 90 tablet 2     Sig: Take 1 tablet by mouth daily.       Hypertension Medications Protocol Failed - 6/19/2024  2:52 PM        Failed - Last BP reading less than 140/90     BP Readings from Last 1 Encounters:   06/12/24 142/82               Passed - CMP or BMP in past 12 months        Passed - In person appointment or virtual visit in the past 12 mos or appointment in next 3 mos     Recent Outpatient Visits              1 week ago Increased severity of headaches    [facility], Emanuel Colbert MD    Office Visit    4 months ago Routine physical examination    [facility], Emanuel Colbert MD    Office Visit    1 year ago Pre-op exam    [facility], Emanuel Colbert MD    Office Visit    1 year ago Gastroesophageal reflux disease, unspecified whether esophagitis present    [facility] [place] Ronda Sharpe APRN    Office Visit    1 year ago Routine physical examination    [facility], Emanuel Colbert MD    Office Visit          Future Appointments         Provider Department Appt Notes    In 1 month [facility] MRI RM1 (1.5T WIDE) [facility] MRI - [place]     In 2 months Ronda Sharpe APRN [facility] ER f/u                    Passed - EGFRCR or GFRNAA > 50     GFR Evaluation  EGFRCR: 112 , resulted on 2/13/2024             Future Appointments         Provider Department Appt Notes    In 1 month [facility] MRI RM1 (1.5T WIDE) [facility] MRI - [place]     In 2 months Ronda Sharpe APRN [facility],  [facility] ER f/u          Recent Outpatient Visits              1 week ago Increased severity of headaches    [facility], [facility], Emanuel Colbert MD    Office Visit    4 months ago Routine physical examination    [facility], [facility], Emanuel Colbert MD    Office Visit    1 year ago Pre-op exam    [facility], [facility], Emanuel Colbert MD    Office Visit    1 year ago Gastroesophageal reflux disease, unspecified whether esophagitis present    [facility], [facility] Ronda Sharpe APRN    Office Visit    1 year ago Routine physical examination    [facility], Emanuel Colbert MD    Office Visit

## 2024-07-09 RX ORDER — SUCRALFATE 1 G/1
1 TABLET ORAL
Qty: 90 TABLET | Refills: 0 | OUTPATIENT
Start: 2024-07-09

## 2024-07-09 NOTE — TELEPHONE ENCOUNTER
Requested Prescriptions     Pending Prescriptions Disp Refills    SUCRALFATE 1 g Oral Tab [Pharmacy Med Name: Sucralfate 1 Gm Tab Nost] 90 tablet 0     Sig: TAKE ONE TABLET BY MOUTH THREE TIMES A DAY BEFORE MEALS        LOV      2/9/2023    LR   5/29/2024

## 2024-08-08 ENCOUNTER — HOSPITAL ENCOUNTER (OUTPATIENT)
Dept: MRI IMAGING | Age: 54
Discharge: HOME | End: 2024-08-08
Attending: INTERNAL MEDICINE
Payer: COMMERCIAL

## 2024-08-08 DIAGNOSIS — R51.9 INCREASED SEVERITY OF HEADACHES: ICD-10-CM

## 2024-08-08 DIAGNOSIS — Z82.49 FAMILY HISTORY OF BRAIN ANEURYSM: ICD-10-CM

## 2024-08-08 DIAGNOSIS — R11.0 NAUSEA: ICD-10-CM

## 2024-08-08 PROCEDURE — A9575 INJ GADOTERATE MEGLUMI 0.1ML: HCPCS | Performed by: INTERNAL MEDICINE

## 2024-08-08 PROCEDURE — 70553 MRI BRAIN STEM W/O & W/DYE: CPT | Performed by: INTERNAL MEDICINE

## 2024-08-08 RX ORDER — GADOTERATE MEGLUMINE 376.9 MG/ML
20 INJECTION INTRAVENOUS
Status: COMPLETED | OUTPATIENT
Start: 2024-08-08 | End: 2024-08-08

## 2024-08-08 RX ADMIN — GADOTERATE MEGLUMINE 20 ML: 376.9 INJECTION INTRAVENOUS at 15:09:00

## 2024-09-06 ENCOUNTER — TELEPHONE (OUTPATIENT)
Dept: INTERNAL MEDICINE CLINIC | Facility: CLINIC | Age: 54
End: 2024-09-06

## 2024-09-06 NOTE — TELEPHONE ENCOUNTER
Received fax from [facility] requesting pre-op evaluation due to surgery- right knee arthroscopic scheduled on 9/27/24 with Dr. Lewis Ochoa. Patient contacted and informed to schedule pre-op exam with Dr Cantu or AYANNA Donahue.

## 2024-09-06 NOTE — TELEPHONE ENCOUNTER
Patient asking for a Pre-op Clearance visit with Dr Cantu or whoever he assigns.  Surgery is Sept 27th.    Patient  Workers Comp.  He did not have the information but said he will provide to us when we call him with an appointment.  He prefers an appointment 2pm or later, but will take a morning if necessary as to not delay this surgery.  Call at at:  [phone]   Please reply to pool: EM CC IM FM ALG RHE [[id number]]

## 2024-09-09 ENCOUNTER — OFFICE VISIT (OUTPATIENT)
Dept: INTERNAL MEDICINE CLINIC | Facility: CLINIC | Age: 54
End: 2024-09-09
Payer: COMMERCIAL

## 2024-09-09 ENCOUNTER — LAB ENCOUNTER (OUTPATIENT)
Dept: LAB | Age: 54
End: 2024-09-09
Attending: NURSE PRACTITIONER
Payer: COMMERCIAL

## 2024-09-09 ENCOUNTER — EKG ENCOUNTER (OUTPATIENT)
Dept: LAB | Age: 54
End: 2024-09-09
Attending: NURSE PRACTITIONER
Payer: COMMERCIAL

## 2024-09-09 VITALS
HEART RATE: 78 BPM | WEIGHT: 251 LBS | RESPIRATION RATE: 20 BRPM | TEMPERATURE: 97 F | SYSTOLIC BLOOD PRESSURE: 112 MMHG | DIASTOLIC BLOOD PRESSURE: 78 MMHG | BODY MASS INDEX: 35.14 KG/M2 | HEIGHT: 71 IN

## 2024-09-09 DIAGNOSIS — Z01.818 PRE-OP EXAMINATION: ICD-10-CM

## 2024-09-09 DIAGNOSIS — S83.231D COMPLEX TEAR OF MEDIAL MENISCUS OF RIGHT KNEE AS CURRENT INJURY, SUBSEQUENT ENCOUNTER: ICD-10-CM

## 2024-09-09 DIAGNOSIS — Z01.818 PRE-OP EXAMINATION: Primary | ICD-10-CM

## 2024-09-09 DIAGNOSIS — I10 ESSENTIAL HYPERTENSION: ICD-10-CM

## 2024-09-09 DIAGNOSIS — F41.9 ANXIETY AND DEPRESSION: ICD-10-CM

## 2024-09-09 DIAGNOSIS — F32.A ANXIETY AND DEPRESSION: ICD-10-CM

## 2024-09-09 LAB
ALBUMIN SERPL-MCNC: 4.4 G/DL (ref 3.2–4.8)
ALBUMIN/GLOB SERPL: 1.5 {RATIO} (ref 1–2)
ALP LIVER SERPL-CCNC: 66 U/L
ALT SERPL-CCNC: 11 U/L
ANION GAP SERPL CALC-SCNC: 7 MMOL/L (ref 0–18)
AST SERPL-CCNC: 11 U/L (ref ?–34)
ATRIAL RATE: 68 BPM
BASOPHILS # BLD AUTO: 0.04 X10(3) UL (ref 0–0.2)
BASOPHILS NFR BLD AUTO: 0.6 %
BILIRUB SERPL-MCNC: 0.5 MG/DL (ref 0.3–1.2)
BUN BLD-MCNC: 16 MG/DL (ref 9–23)
BUN/CREAT SERPL: 18.8 (ref 10–20)
CALCIUM BLD-MCNC: 9.7 MG/DL (ref 8.7–10.4)
CHLORIDE SERPL-SCNC: 103 MMOL/L (ref 98–112)
CO2 SERPL-SCNC: 30 MMOL/L (ref 21–32)
CREAT BLD-MCNC: 0.85 MG/DL
DEPRECATED RDW RBC AUTO: 41.2 FL (ref 35.1–46.3)
EGFRCR SERPLBLD CKD-EPI 2021: 105 ML/MIN/1.73M2 (ref 60–?)
EOSINOPHIL # BLD AUTO: 0.15 X10(3) UL (ref 0–0.7)
EOSINOPHIL NFR BLD AUTO: 2.4 %
ERYTHROCYTE [DISTWIDTH] IN BLOOD BY AUTOMATED COUNT: 13.3 % (ref 11–15)
FASTING STATUS PATIENT QL REPORTED: NO
GLOBULIN PLAS-MCNC: 2.9 G/DL (ref 2–3.5)
GLUCOSE BLD-MCNC: 90 MG/DL (ref 70–99)
HCT VFR BLD AUTO: 41 %
HGB BLD-MCNC: 13.2 G/DL
IMM GRANULOCYTES # BLD AUTO: 0.01 X10(3) UL (ref 0–1)
IMM GRANULOCYTES NFR BLD: 0.2 %
LYMPHOCYTES # BLD AUTO: 1.82 X10(3) UL (ref 1–4)
LYMPHOCYTES NFR BLD AUTO: 28.9 %
MCH RBC QN AUTO: 27.6 PG (ref 26–34)
MCHC RBC AUTO-ENTMCNC: 32.2 G/DL (ref 31–37)
MCV RBC AUTO: 85.8 FL
MONOCYTES # BLD AUTO: 0.51 X10(3) UL (ref 0.1–1)
MONOCYTES NFR BLD AUTO: 8.1 %
NEUTROPHILS # BLD AUTO: 3.76 X10 (3) UL (ref 1.5–7.7)
NEUTROPHILS # BLD AUTO: 3.76 X10(3) UL (ref 1.5–7.7)
NEUTROPHILS NFR BLD AUTO: 59.8 %
OSMOLALITY SERPL CALC.SUM OF ELEC: 291 MOSM/KG (ref 275–295)
P AXIS: 36 DEGREES
P-R INTERVAL: 162 MS
PLATELET # BLD AUTO: 337 10(3)UL (ref 150–450)
POTASSIUM SERPL-SCNC: 3.8 MMOL/L (ref 3.5–5.1)
PROT SERPL-MCNC: 7.3 G/DL (ref 5.7–8.2)
Q-T INTERVAL: 394 MS
QRS DURATION: 100 MS
QTC CALCULATION (BEZET): 418 MS
R AXIS: 28 DEGREES
RBC # BLD AUTO: 4.78 X10(6)UL
SODIUM SERPL-SCNC: 140 MMOL/L (ref 136–145)
T AXIS: 27 DEGREES
VENTRICULAR RATE: 68 BPM
WBC # BLD AUTO: 6.3 X10(3) UL (ref 4–11)

## 2024-09-09 PROCEDURE — 36415 COLL VENOUS BLD VENIPUNCTURE: CPT

## 2024-09-09 PROCEDURE — 93010 ELECTROCARDIOGRAM REPORT: CPT | Performed by: INTERNAL MEDICINE

## 2024-09-09 PROCEDURE — 85025 COMPLETE CBC W/AUTO DIFF WBC: CPT

## 2024-09-09 PROCEDURE — 80053 COMPREHEN METABOLIC PANEL: CPT

## 2024-09-09 PROCEDURE — 93005 ELECTROCARDIOGRAM TRACING: CPT

## 2024-09-09 RX ORDER — SUCRALFATE 1 G/1
1 TABLET ORAL
COMMUNITY
Start: 2024-06-01

## 2024-09-09 RX ORDER — IBUPROFEN 800 MG/1
1 TABLET, FILM COATED ORAL
COMMUNITY
Start: 2024-07-09 | End: 2024-09-09 | Stop reason: ALTCHOICE

## 2024-09-09 RX ORDER — POLYETHYLENE GLYCOL 3350, SODIUM CHLORIDE, SODIUM BICARBONATE, POTASSIUM CHLORIDE 420; 11.2; 5.72; 1.48 G/4L; G/4L; G/4L; G/4L
240 POWDER, FOR SOLUTION ORAL AS DIRECTED
COMMUNITY
Start: 2024-02-29 | End: 2024-09-09 | Stop reason: ALTCHOICE

## 2024-09-09 NOTE — PATIENT INSTRUCTIONS
Hold all supplements 7 days prior to surgery     Do not take any Advil or ibuprofen 7 days before surgery.     If you have pain it is recommended to take tylenol

## 2024-09-09 NOTE — PROGRESS NOTES
Denilson Hayes is a 53 year old male.  Chief Complaint   Patient presents with    Pre-Op Exam     Patient scheduled for surgery on 9/27/24, right knee arthroscopic partial medial meniscectomy with Dr Lewis Ochoa. [phone] Fax [phone].     HPI:   Denilson Hayes presents for preoperative clearance for: right knee arthroscopic partial medial meniscectomy   Performing Physician: Dr Ochoa   Date of surgery: 9/27/2024  Elective or emergency: elective   Pre-operative forms provided: Yes     Current complaints:   Right knee pain - injury 7/9/2024    Active medical problems:   HTN  Anxiety and depression    Cervical/neck:   - Arthritis: Yes   - Neck pain: No   - Difficulty with ROM: No   - Prior neck injury/surgery: No     Cardiac:  - History of ischemic coronary disease: No   - History of heart failure: No   - Heart attack in past 90 days: No  - Chest pain in last 90 days: No   - History of Arrhythmia:  No   - History of stroke or TIA: No   - Diabetes/A1C: Last A1c value was  % done  .      - Most recent echo/Stress test: None    - Cardiac Medications: No     Pulmonary:   - Smoker: Former  quit 5 years ago   - Apnea/CPAP: No   - Asthma/COPD: No   - Difficulty laying flat for extended period of time: No  - Dyspnea/exertional: No   - Respiratory Medications: No     Functional Capacity:   Perform ADLs- eating, dress, toilet (1 METs)? Yes   Walk up flight of steps, hill, walk ground level 3-4mph (4 METs)?  Yes   Perform heavy housework- scrubbing floors, move heavy furniture, climb 2 flights of stairs (4-10 METs)? Yes   Participate in strenuous sports- swimming, singles tennis, football, basketball, ski (+10 METs)?  No         Current Outpatient Medications   Medication Sig Dispense Refill    sucralfate 1 g Oral Tab Take 1 tablet (1 g total) by mouth 3 (three) times daily before meals.      lisinopril-hydroCHLOROthiazide 20-25 MG Oral Tab Take 1 tablet by mouth daily. 90 tablet 3    Sildenafil Citrate 100 MG Oral Tab Take 1  tablet (100 mg total) by mouth daily as needed for Erectile Dysfunction. 30 tablet 1    cyclobenzaprine 5 MG Oral Tab Take 1 tablet (5 mg total) by mouth nightly as needed. AT BEDTIME      pantoprazole 40 MG Oral Tab EC Take 1 tablet (40 mg total) by mouth daily as needed. 90 tablet 3    multivitamin with minerals Oral Tab Take 1 tablet by mouth daily. 30 tablet 0      Past Medical History:    Alcohol withdrawal (HCC)    Alcohol withdrawal syndrome without complication (HCC)    Anxiety state    Carpal tunnel syndrome, left    Depression    Disorder of liver    Essential hypertension    High blood pressure    Hypertension    Migraines    MRSA (methicillin resistant Staphylococcus aureus)    MRSA groin abcess - hospitalized; IV antibiotics; debridement    Pneumonia    left lower lobe - out pt. antibiotics    Rheumatoid arthritis (HCC)    Still's disease (HCC)    Still's disease (HCC)    Still's disease (HCC)    Tinnitus    Transaminitis    Visual impairment    going to see an eye dr      Past Surgical History:   Procedure Laterality Date    Colonoscopy  2024    COLONOSCOPY with polypectomies    Colonoscopy N/A 3/4/2024    Procedure: COLONOSCOPY;  Surgeon: Sylvia Cooper MD;  Location: [facility] ENDOSCOPY    Egd  2024    ESOPHAGOGASTRODUODENOSCOPY with biopsies and polypectomy    Other surgical history  1991    hernia repair     Other surgical history  2011    right thigh cellulitis    Wrist arthroscop,release xvers lig Left 2017    endoscopic carpal tunnel release      Social History:  Social History     Socioeconomic History    Marital status: Single   Tobacco Use    Smoking status: Former     Current packs/day: 0.00     Types: Cigarettes     Start date: 2002     Quit date: 2012     Years since quittin.1     Passive exposure: Never    Smokeless tobacco: Former     Quit date: 2012    Tobacco comments:     1 pk would last a week   Vaping Use    Vaping status: Former    Quit  date: 4/9/2020   Substance and Sexual Activity    Alcohol use: Not Currently     Alcohol/week: 0.0 standard drinks of alcohol     Comment: 1 x a week    Drug use: No    Sexual activity: Not Currently     Partners: Female   Other Topics Concern    Caffeine Concern Yes     Comment: coffee/tea - sometimes     Social Determinants of Health     Food Insecurity: No Food Insecurity (5/9/2023)    Received from [facility]    Hunger Vital Sign     Worried About Running Out of Food in the Last Year: Never true     Ran Out of Food in the Last Year: Never true   Transportation Needs: No Transportation Needs (5/9/2023)    Received from [facility]    PRAPARE - Transportation     Lack of Transportation (Medical): No     Lack of Transportation (Non-Medical): No      Family History   Problem Relation Age of Onset    Diabetes Father     Hypertension Father     Cancer Mother         cervical    Diabetes Mother     Hypertension Mother     Depression Sister     Hypertension Brother       No Known Allergies     REVIEW OF SYSTEMS:     Review of Systems   Constitutional:  Negative for fever.   HENT: Negative.     Respiratory:  Negative for cough, shortness of breath and wheezing.    Cardiovascular:  Negative for chest pain.   Gastrointestinal: Negative.    Genitourinary: Negative.    Musculoskeletal:  Positive for arthralgias and gait problem (cane).   Skin: Negative.    Psychiatric/Behavioral: Negative.        Wt Readings from Last 5 Encounters:   09/09/24 251 lb (113.9 kg)   06/12/24 249 lb 6 oz (113.1 kg)   03/01/24 240 lb (108.9 kg)   02/12/24 245 lb (111.1 kg)   04/19/23 240 lb (108.9 kg)     Body mass index is 35.01 kg/m².      EXAM:   /78 (BP Location: Left arm, Patient Position: Sitting, Cuff Size: large)   Pulse 78   Temp 97 °F (36.1 °C) (Other)   Resp 20   Ht 5' 11\" (1.803 m)   Wt 251 lb (113.9 kg)   BMI 35.01 kg/m²      Physical Exam  Vitals reviewed.   Constitutional:       Appearance: Normal appearance.   HENT:      Head: Normocephalic.   Cardiovascular:      Rate and Rhythm: Normal rate and regular rhythm.      Pulses: Normal pulses.   Pulmonary:      Breath sounds: Normal breath sounds. No wheezing.   Musculoskeletal:         General: No swelling.      Right knee: Decreased range of motion.   Skin:     General: Skin is warm and dry.   Neurological:      Mental Status: He is alert and oriented to person, place, and time.   Psychiatric:         Mood and Affect: Mood normal.         Behavior: Behavior normal.            ASSESSMENT AND PLAN:   1. Pre-op examination  - surgery clearance based on lab results and EKG   - CBC With Differential With Platelet; Future  - Comp Metabolic Panel (14); Future  - EKG 12 Lead to be performed at [facility]; Future    2. Essential hypertension  - stable   - CPM    3. Complex tear of medial meniscus of right knee as current injury, subsequent encounter  - following with ortho  - surgery scheduled for 9/27    4. Anxiety and depression  - CPM   - patient not currently on medication    Addendum:  Preoperative testing reviewed. Normal lab work. EKG shows NSR. Based on the above documented history, physical and preoperative testing, I see no medical contraindication to proceeding with the planned procedure.

## 2024-11-19 ENCOUNTER — HOSPITAL ENCOUNTER (OUTPATIENT)
Dept: GENERAL RADIOLOGY | Age: 54
Discharge: HOME | End: 2024-11-19
Attending: INTERNAL MEDICINE
Payer: COMMERCIAL

## 2024-11-19 ENCOUNTER — OFFICE VISIT (OUTPATIENT)
Dept: INTERNAL MEDICINE CLINIC | Facility: CLINIC | Age: 54
End: 2024-11-19
Payer: COMMERCIAL

## 2024-11-19 VITALS
DIASTOLIC BLOOD PRESSURE: 82 MMHG | SYSTOLIC BLOOD PRESSURE: 128 MMHG | BODY MASS INDEX: 36.07 KG/M2 | HEIGHT: 71 IN | WEIGHT: 257.63 LBS | TEMPERATURE: 98 F | OXYGEN SATURATION: 96 % | HEART RATE: 74 BPM

## 2024-11-19 DIAGNOSIS — J06.9 ACUTE URI: ICD-10-CM

## 2024-11-19 DIAGNOSIS — R06.02 SHORTNESS OF BREATH: ICD-10-CM

## 2024-11-19 DIAGNOSIS — J06.9 ACUTE URI: Primary | ICD-10-CM

## 2024-11-19 PROCEDURE — 99213 OFFICE O/P EST LOW 20 MIN: CPT | Performed by: INTERNAL MEDICINE

## 2024-11-19 PROCEDURE — 3008F BODY MASS INDEX DOCD: CPT | Performed by: INTERNAL MEDICINE

## 2024-11-19 PROCEDURE — 71046 X-RAY EXAM CHEST 2 VIEWS: CPT | Performed by: INTERNAL MEDICINE

## 2024-11-19 PROCEDURE — 3074F SYST BP LT 130 MM HG: CPT | Performed by: INTERNAL MEDICINE

## 2024-11-19 PROCEDURE — 3079F DIAST BP 80-89 MM HG: CPT | Performed by: INTERNAL MEDICINE

## 2024-11-19 RX ORDER — AZITHROMYCIN 250 MG/1
TABLET, FILM COATED ORAL
Qty: 6 TABLET | Refills: 0 | Status: SHIPPED | OUTPATIENT
Start: 2024-11-19 | End: 2024-11-24

## 2024-11-19 NOTE — PROGRESS NOTES
Denilson Hayes is a 53 year old male.   Chief Complaint   Patient presents with    Cough     Patient c/o runny nose, chills, congestion, sore throat     HPI:   Since last Tuesday, 7 days ago, he has had persistent symptoms of chills, headache, achiness, nasal congestion with yellow drainage with sinus pressure, sore throat and cough productive of brown-green sputum.  He also notices persistent shortness of breath, including now.  No fever.  No ear pain.  Symptoms do not seem to be improving.  Current Outpatient Medications   Medication Sig Dispense Refill    azithromycin (ZITHROMAX Z-NATHALIE) 250 MG Oral Tab Take 2 tablets (500 mg total) by mouth daily for 1 day, THEN 1 tablet (250 mg total) daily for 4 days. 6 tablet 0    sucralfate 1 g Oral Tab Take 1 tablet (1 g total) by mouth 3 (three) times daily before meals.      lisinopril-hydroCHLOROthiazide 20-25 MG Oral Tab Take 1 tablet by mouth daily. 90 tablet 3    Sildenafil Citrate 100 MG Oral Tab Take 1 tablet (100 mg total) by mouth daily as needed for Erectile Dysfunction. 30 tablet 1    pantoprazole 40 MG Oral Tab EC Take 1 tablet (40 mg total) by mouth daily as needed. 90 tablet 3    multivitamin with minerals Oral Tab Take 1 tablet by mouth daily. 30 tablet 0     Allergies[1]   Past Medical History:    Alcohol withdrawal (HCC)    Alcohol withdrawal syndrome without complication (HCC)    Anxiety state    Carpal tunnel syndrome, left    Depression    Disorder of liver    Essential hypertension    High blood pressure    Hypertension    Migraines    MRSA (methicillin resistant Staphylococcus aureus)    MRSA groin abcess - hospitalized; IV antibiotics; debridement    Pneumonia    left lower lobe - out pt. antibiotics    Rheumatoid arthritis (HCC)    Still's disease (HCC)    Still's disease (HCC)    Still's disease (HCC)    Tinnitus    Transaminitis    Visual impairment    going to see an eye       Social History:  Social History     Socioeconomic History    Marital  status: Single   Tobacco Use    Smoking status: Former     Current packs/day: 0.00     Types: Cigarettes     Start date: 2002     Quit date: 2012     Years since quittin.3     Passive exposure: Never    Smokeless tobacco: Former     Quit date: 2012    Tobacco comments:     1 pk would last a week   Vaping Use    Vaping status: Former    Quit date: 2020   Substance and Sexual Activity    Alcohol use: Not Currently     Alcohol/week: 0.0 standard drinks of alcohol     Comment: 1 x a week    Drug use: No    Sexual activity: Not Currently     Partners: Female   Other Topics Concern    Caffeine Concern Yes     Comment: coffee/tea - sometimes     Social Drivers of Health     Food Insecurity: No Food Insecurity (2023)    Received from [facility]    Hunger Vital Sign     Worried About Running Out of Food in the Last Year: Never true     Ran Out of Food in the Last Year: Never true   Transportation Needs: No Transportation Needs (2023)    Received from [facility]    PRAPARE - Transportation     Lack of Transportation (Medical): No     Lack of Transportation (Non-Medical): No        EXAM:   GENERAL: Pleasant male appearing well and breathing very comfortably in no distress  /82   Pulse 74   Temp 98.2 °F (36.8 °C) (Temporal)   Ht 5' 11\" (1.803 m)   Wt 257 lb 9.6 oz (116.8 kg)   SpO2 96%   BMI 35.93 kg/m²   HEENT: Anicteric, conjunctiva pink, canals and TMs normal bilaterally, no maxillary or frontal sinus tenderness, oropharynx normal without erythema ulceration swelling or exudate  NECK: Supple without mass or lymphadenopathy  LUNGS: Resonant to percussion and clear to auscultation without crackles or wheezes    ASSESSMENT AND PLAN:   1. Acute URI  With associated dyspnea.  Consider pneumonia though lungs clear and O2 saturation normal  Chest x-ray today.  Order sent  Zithromax 250 mg 2  pills today then 1 pill daily x 4 days beginning tomorrow.  Prescription sent to pharmacy  Call if no better  - XR CHEST PA + LAT CHEST (CPT=71046); Future    2. Shortness of breath  Await chest x-ray as above  - XR CHEST PA + LAT CHEST (CPT=71046); Future    The patient indicates understanding of these issues and agrees to the plan.    Lewis Madden MD  11/19/2024  10:35 AM        [1] No Known Allergies

## 2025-03-19 NOTE — TELEPHONE ENCOUNTER
Patient requesting remaining refills go to the [facility].    Prescription currently at [facility] in [place].

## 2025-03-19 NOTE — TELEPHONE ENCOUNTER
Patient is out of medication. Needs refill for lisinopril-hydroCHLOROthiazide 20-25 MG Oral Tab        [id number] - [place], [place] - [address] [phone], [phone]

## 2025-03-21 NOTE — TELEPHONE ENCOUNTER
Response to cancel request received from pharmacy. Left detailed message on [employer].   Received: 2 days ago  Interface, [facility]  [facility] Refills  The pharmacy received the electronic cancel request, but could not cancel the prescription. Additional follow up tasks may be necessary based on the pharmacy response noted below.    Pharmacy Note: Unable to Cancel Rx. Please contact Pharmacy          Pharmacy    [id number] - [place], [place] - [address] AT [facility], [phone], [phone]   [address] [facility] [address]   Phone: [phone] Fax: [phone]   Hours: Open 24 hours     Outpatient Medication Detail     Disp Refills Start End    lisinopril-hydroCHLOROthiazide 20-25 MG Oral Tab (Discontinued) 90 tablet 3 6/19/2024 3/19/2025    Sig - Route: Take 1 tablet by mouth daily. - Oral    Sent to pharmacy as: Lisinopril-hydroCHLOROthiazide 20-25 MG Oral Tablet (Zestoretic)    Reason for Discontinue:  E-Prescribed in Error    E-Prescribing Status: Receipt confirmed by pharmacy (6/19/2024  6:38 PM CDT)    E-Cancel Status: Request denied by pharmacy (3/19/2025 12:16 PM CDT)       E-Cancel Status Note: Unable to Cancel Rx. Please contact Pharmacy      Order Providers    Prescribing Provider Encounter Provider   Emanuel Cantu MD Kaliski, Joseph, MD     Medication Notes        Brie Goldberg CPhT   3/19/2025 12:16 PM  Pt requesting remaining refills go to [facility] in [place].                Encounter    View Encounter              This Order Has Been Discontinued    Order Status Reason By On   Discontinued  E-Prescribed in Error Brie Goldberg CPhT 3/19/25 1216

## 2025-06-18 NOTE — TELEPHONE ENCOUNTER
Received fax from [facility] requesting pre-op clearance for patient surgery scheduled for 9/17/25, right partial knee replacement with Dr. Rangel Alexander. They have also requested pre-op testing-  CBC BMP,PTT,PT/INR, EKG and UA with reflex.    Patient contacted and appointment scheduled with Dr Cantu on 9/3/25 for pre-op clearance.    Patient to call office back to inform MD/Staff  which lab services are covered By his insurance. Informed patient labs are to be completed no earlier then 30 days prior to his scheduled surgery. Patient voiced understanding states will complete pre-op testing on 9/1/25.

## (undated) DIAGNOSIS — Z12.11 ENCOUNTER FOR SCREENING COLONOSCOPY: Primary | ICD-10-CM

## (undated) DEVICE — MEDI-VAC NON-CONDUCTIVE SUCTION TUBING 6MM X 1.8M (6FT.) L: Brand: CARDINAL HEALTH

## (undated) DEVICE — KIT CLEAN ENDOKIT 1.1OZ GOWNX2

## (undated) DEVICE — SUTURE ETHILON 4-0 699G

## (undated) DEVICE — STERILE LATEX POWDER-FREE SURGICAL GLOVESWITH NITRILE COATING: Brand: PROTEXIS

## (undated) DEVICE — COVER SGL STRL LGHT HNDL BLU

## (undated) DEVICE — SOL  .9 1000ML BTL

## (undated) DEVICE — GIJAW SINGLE-USE BIOPSY FORCEPS WITH NEEDLE: Brand: GIJAW

## (undated) DEVICE — 60 ML SYRINGE REGULAR TIP: Brand: MONOJECT

## (undated) DEVICE — Device: Brand: DUAL NARE NASAL CANNULAE FEMALE LUER CON 7FT O2 TUBE

## (undated) DEVICE — ECTRA II PROCEDURE KIT,                                    SINGLE-USE, DISPOSABLE. CONTENTS                                    PROBE KNIFE, RETROGRADE KNIFE,                                    TRIANGLE KNIFE, HAND PAD, SWABS: Brand: ECTRA

## (undated) DEVICE — LASSO POLYPECTOMY SNARE: Brand: LASSO

## (undated) DEVICE — KENDALL SCD EXPRESS SLEEVES, KNEE LENGTH, MEDIUM: Brand: KENDALL SCD

## (undated) DEVICE — PLASTIC HAND: Brand: MEDLINE INDUSTRIES, INC.

## (undated) DEVICE — ZIMMER® STERILE DISPOSABLE TOURNIQUET CUFF WITH PLC, DUAL PORT, SINGLE BLADDER, 18 IN. (46 CM)

## (undated) DEVICE — KIT ENDO ORCAPOD 160/180/190

## (undated) DEVICE — ESMARK: Brand: DEROYAL

## (undated) NOTE — LETTER
[facility]  [address], [place], [place]  Authorization for Surgical Operation and Procedure                                                                                           I hereby authorize Sylvia Cooper MD, my physician and his/her assistants (if applicable), which may include medical students, residents, and/or fellows, to perform the following surgical operation/ procedure and administer such anesthesia as may be determined necessary by my physician: Operation/Procedure name (s) COLONOSCOPY on Denilson Hayes   2.   I recognize that during the surgical operation/procedure, unforeseen conditions may necessitate additional or different procedures than those listed above.  I, therefore, further authorize and request that the above-named surgeon, assistants, or designees perform such procedures as are, in their judgment, necessary and desirable.    3.   My surgeon/physician has discussed prior to my surgery the potential benefits, risks and side effects of this procedure; the likelihood of achieving goals; and potential problems that might occur during recuperation.  They also discussed reasonable alternatives to the procedure, including risks, benefits, and side effects related to the alternatives and risks related to not receiving this procedure.  I have had all my questions answered and I acknowledge that no guarantee has been made as to the result that may be obtained.    4.   Should the need arise during my operation/procedure, which includes change of level of care prior to discharge, I also consent to the administration of blood and/or blood products.  Further, I understand that despite careful testing and screening of blood or blood products by collecting agencies, I may still be subject to ill effects as a result of receiving a blood transfusion and/or blood products.  The following are some, but not all, of the potential risks that can occur: fever and allergic reactions,  hemolytic reactions, transmission of diseases such as Hepatitis, AIDS and Cytomegalovirus (CMV) and fluid overload.  In the event that I wish to have an autologous transfusion of my own blood, or a directed donor transfusion, I will discuss this with my physician.  Check only if Refusing Blood or Blood Products  I understand refusal of blood or blood products as deemed necessary by my physician may have serious consequences to my condition to include possible death. I hereby assume responsibility for my refusal and release the hospital, its personnel, and my physicians from any responsibility for the consequences of my refusal.    o  Refuse   5.   I authorize the use of any specimen, organs, tissues, body parts or foreign objects that may be removed from my body during the operation/procedure for diagnosis, research or teaching purposes and their subsequent disposal by hospital authorities.  I also authorize the release of specimen test results and/or written reports to my treating physician on the hospital medical staff or other referring or consulting physicians involved in my care, at the discretion of the Pathologist or my treating physician.    6.   I consent to the photographing or videotaping of the operations or procedures to be performed, including appropriate portions of my body for medical, scientific, or educational purposes, provided my identity is not revealed by the pictures or by descriptive texts accompanying them.  If the procedure has been photographed/videotaped, the surgeon will obtain the original picture, image, videotape or CD.  The hospital will not be responsible for storage, release or maintenance of the picture, image, tape or CD.    7.   I consent to the presence of a  or observers in the operating room as deemed necessary by my physician or their designees.    8.   I recognize that in the event my procedure results in extended X-Ray/fluoroscopy time, I may develop a  skin reaction.    9. If I have a Do Not Attempt Resuscitation (DNAR) order in place, that status will be suspended while in the operating room, procedural suite, and during the recovery period unless otherwise explicitly stated by me (or a person authorized to consent on my behalf). The surgeon or my attending physician will determine when the applicable recovery period ends for purposes of reinstating the DNAR order.  10. Patients having a sterilization procedure: I understand that if the procedure is successful the results will be permanent and it will therefore be impossible for me to inseminate, conceive, or bear children.  I also understand that the procedure is intended to result in sterility, although the result has not been guaranteed.   11. I acknowledge that my physician has explained sedation/analgesia administration to me including the risk and benefits I consent to the administration of sedation/analgesia as may be necessary or desirable in the judgment of my physician.    I CERTIFY THAT I HAVE READ AND FULLY UNDERSTAND THE ABOVE CONSENT TO OPERATION and/or OTHER PROCEDURE.     _________________________________________ _________________________________     ___________________________________  Signature of Patient     Signature of Responsible Person                   Printed Name of Responsible Person                              _________________________________________ ______________________________        ___________________________________  Signature of Witness         Date  Time         Relationship to Patient    STATEMENT OF PHYSICIAN My signature below affirms that prior to the time of the procedure; I have explained to the patient and/or his/her legal representative, the risks and benefits involved in the proposed treatment and any reasonable alternative to the proposed treatment. I have also explained the risks and benefits involved in refusal of the proposed treatment and alternatives to the  proposed treatment and have answered the patient's questions. If I have a significant financial interest in a co-management agreement or a significant financial interest in any product or implant, or other significant relationship used in this procedure/surgery, I have disclosed this and had a discussion with my patient.     _______________________________________________________________ _____________________________  (Signature of Physician)                                                                                         (Date)                                   (Time)  Patient Name: Denilson Hayes    : 12/15/1970   Printed: 3/1/2024      Medical Record #: [id number]                                              Page 1

## (undated) NOTE — MR AVS SNAPSHOT
[place] [address], [address] [place], [place] [address] [phone]               Thank you for choosing us for your health care visit with Aicha Hutson MD.  We are glad to serve you and happy to provide you with this summary Today's Orders     EMG (At [facility]- Jacquelyn Bonilla, Sid)    Complete by:  Jan 09, 2017              Follow-up Instructions     Return in about 2 weeks (around 1/23/2017) for Schedule EMG.       Scheduling Instructions     Albert

## (undated) NOTE — MR AVS SNAPSHOT
[facility]  [address], [address] [facility]  [address] [facility] [phone]               Thank you for choosing us for your health care visit with Bre Gomez MD, MD.  We are glad to serve you and happy to provide you with this summary of appointment. ? To best provide you care, patients receiving routine medications need to be seen at least once a year.  protocol for controlled substances:  Written prescriptions    ? Written prescriptions must be picked up in office. ?  Please

## (undated) NOTE — Clinical Note
2017      Patient: Trae Nesbitt  : 12/15/1970 Visit date: 3/23/2017    Dear Robyn Cedeño,      I examined your patient in follow-up today. He is 3 weeks post left endoscopic carpal tunnel release.     He has mild intermittent tingling, but

## (undated) NOTE — Clinical Note
2017      Patient: Trae Regalado  : 12/15/1970 Visit date: 2017    Dear Margot Zaidi,      I examined your patient in consultation today. He has severe progressive left carpal tunnel syndrome.   He is beginning to develop symptoms on th

## (undated) NOTE — LETTER
4/13/2022              Cherie Connelly        [address] [place]         Dear Madina Silverio,    This letter is to inform you that our office has made several attempts to reach you by phone without success. We were attempting to contact you by phone regarding testing that you are due for. Screening for colon cancer saves lives. You are due for your colonoscopy. An order has been placed to make an appointment with our gastroenterologist team. Please call [phone]. If you are reluctant to have this procedure please  a FIT card from the lab and submit a stool sample that also screens for colon cancer. NORBERT Ford   Working with Octavio Blend      Please contact our office at the number listed below as soon as you receive this letter to discuss this issue and to make the necessary changes in our system to your contact information. Thank you for your cooperation.         Sincerely,    MD Justyna Vega [address]  [phone]

## (undated) NOTE — Clinical Note
5/15/2017              [address] [place] [id number]         To Whom It May Concern,    Mr. Jyoti Goldstein was seen by me today in my office for evaluation of an acute upper respiratory infection.     Sincerely,    Rigo Houser,

## (undated) NOTE — MR AVS SNAPSHOT
[phone], [address] [address]  [phone]               Thank you for choosing us for your health care visit with Jadene Mcburney.   We are glad to serve you and happy to provide you with this summary of yo Commonly known as:  INDOCIN SR           lisinopril 20 MG Tabs   TAKE 1 TABLET BY MOUTH EVERY DAY   Commonly known as:  PRINIVIL,ZESTRIL           Methotrexate Sodium 2.5 MG Tabs   Take 8 tablets every Friday.            MULTI-VITAMINS Tabs   Take  by mouth

## (undated) NOTE — ED AVS SNAPSHOT
Mayte Morales   MRN: [id number]    Department:  [facility]   Date of Visit:  5/6/2019           Disclosure     Insurance plans vary and the physician(s) referred by the ER may not be covered by your plan.  Please contact your i CARE PHYSICIAN AT ONCE OR RETURN IMMEDIATELY TO THE EMERGENCY DEPARTMENT. If you have been prescribed any medication(s), please fill your prescription right away and begin taking the medication(s) as directed.   If you believe that any of the medications

## (undated) NOTE — LETTER
Date & Time: 5/6/2019, 11:51 AM  Patient: Mayte Morales  Encounter Provider(s):    Tom Parker MD       To Whom It May Concern:    Mayte Morales was seen and treated in our department on 5/6/2019.  He may return to work tomorrow, 5/7/19 with no restricti

## (undated) NOTE — LETTER
[facility]  Administration of Anesthesia  IDenilson agree to be cared for by a physician anesthesiologist alone and/or with a nurse anesthetist, who is specially trained to monitor me and give me medicine to put me to sleep or keep me comfortable during my procedure    I understand that my anesthesiologist and/or anesthetist is not an employee or agent of [facility] or [employer]. He or she works for [facility]    As the patient asking for anesthesia services, I agree to:  Allow the anesthesiologist (anesthesia doctor) to give me medicine and do additional procedures as necessary. Some examples are: Starting or using an “IV” to give me medicine, fluids or blood during my procedure, and having a breathing tube placed to help me breathe when I’m asleep (intubation). In the event that my heart stops working properly, I understand that my anesthesiologist will make every effort to sustain my life, unless otherwise directed by [facility] Do Not Resuscitate documents.  Tell my anesthesia doctor before my procedure:  If I am pregnant.  The last time that I ate or drank.  iii. All of the medicines I take (including prescriptions, herbal supplements, and pills I can buy without a prescription (including street drugs/illegal medications). Failure to inform my anesthesiologist about these medicines may increase my risk of anesthetic complications.  iv.If I am allergic to anything or have had a reaction to anesthesia before.  I understand how the anesthesia medicine will help me (benefits).  I understand that with any type of anesthesia medicine there are risks:  The most common risks are: nausea, vomiting, sore throat, muscle soreness, damage to my eyes, mouth, or teeth (from breathing tube placement).  Rare risks include: remembering what happened during my procedure, allergic reactions to medications, injury to my airway, heart, lungs, vision, nerves, or muscles  and in extremely rare instances death.  My doctor has explained to me other choices available to me for my care (alternatives).  Pregnant Patients (“epidural”):  I understand that the risks of having an epidural (medicine given into my back to help control pain during labor), include itching, low blood pressure, difficulty urinating, headache or slowing of the baby’s heart. Very rare risks include infection, bleeding, seizure, irregular heart rhythms and nerve injury.  Regional Anesthesia (“spinal”, “epidural”, & “nerve blocks”):  I understand that rare but potential complications include headache, bleeding, infection, seizure, irregular heart rhythms, and nerve injury.    _____________________________________________________________________________  Patient (or Representative) Signature/Relationship to Patient  Date   Time    _____________________________________________________________________________   Name (if used)    Language/Organization   Time    _____________________________________________________________________________  Nurse Anesthetist Signature     Date   Time  _____________________________________________________________________________  Anesthesiologist Signature     Date   Time  I have discussed the procedure and information above with the patient (or patient’s representative) and answered their questions. The patient or their representative has agreed to have anesthesia services.    _____________________________________________________________________________  Witness        Date   Time  I have verified that the signature is that of the patient or patient’s representative, and that it was signed before the procedure  Patient Name: Denilson Hayes     : 12/15/1970                 Printed: 3/1/2024 at 11:41 AM    Medical Record #: [id number]                                            Page 1 of 1  ----------ANESTHESIA CONSENT----------

## (undated) NOTE — LETTER
[facility]  [address], [place], [place]  Authorization for Surgical Operation and Procedure                                                                                           I hereby authorize ySlvia Cooper MD, my physician and his/her assistants (if applicable), which may include medical students, residents, and/or fellows, to perform the following surgical operation/ procedure and administer such anesthesia as may be determined necessary by my physician: Operation/Procedure name (s) COLONOSCOPY and ESOPHAGOGASTRODUODENOSCOPY on Denilson Hayes   2.   I recognize that during the surgical operation/procedure, unforeseen conditions may necessitate additional or different procedures than those listed above.  I, therefore, further authorize and request that the above-named surgeon, assistants, or designees perform such procedures as are, in their judgment, necessary and desirable.    3.   My surgeon/physician has discussed prior to my surgery the potential benefits, risks and side effects of this procedure; the likelihood of achieving goals; and potential problems that might occur during recuperation.  They also discussed reasonable alternatives to the procedure, including risks, benefits, and side effects related to the alternatives and risks related to not receiving this procedure.  I have had all my questions answered and I acknowledge that no guarantee has been made as to the result that may be obtained.    4.   Should the need arise during my operation/procedure, which includes change of level of care prior to discharge, I also consent to the administration of blood and/or blood products.  Further, I understand that despite careful testing and screening of blood or blood products by collecting agencies, I may still be subject to ill effects as a result of receiving a blood transfusion and/or blood products.  The following are some, but not all, of the potential risks that can occur: fever  and allergic reactions, hemolytic reactions, transmission of diseases such as Hepatitis, AIDS and Cytomegalovirus (CMV) and fluid overload.  In the event that I wish to have an autologous transfusion of my own blood, or a directed donor transfusion, I will discuss this with my physician.  Check only if Refusing Blood or Blood Products  I understand refusal of blood or blood products as deemed necessary by my physician may have serious consequences to my condition to include possible death. I hereby assume responsibility for my refusal and release the hospital, its personnel, and my physicians from any responsibility for the consequences of my refusal.    o  Refuse   5.   I authorize the use of any specimen, organs, tissues, body parts or foreign objects that may be removed from my body during the operation/procedure for diagnosis, research or teaching purposes and their subsequent disposal by hospital authorities.  I also authorize the release of specimen test results and/or written reports to my treating physician on the hospital medical staff or other referring or consulting physicians involved in my care, at the discretion of the Pathologist or my treating physician.    6.   I consent to the photographing or videotaping of the operations or procedures to be performed, including appropriate portions of my body for medical, scientific, or educational purposes, provided my identity is not revealed by the pictures or by descriptive texts accompanying them.  If the procedure has been photographed/videotaped, the surgeon will obtain the original picture, image, videotape or CD.  The hospital will not be responsible for storage, release or maintenance of the picture, image, tape or CD.    7.   I consent to the presence of a  or observers in the operating room as deemed necessary by my physician or their designees.    8.   I recognize that in the event my procedure results in extended X-Ray/fluoroscopy  time, I may develop a skin reaction.    9. If I have a Do Not Attempt Resuscitation (DNAR) order in place, that status will be suspended while in the operating room, procedural suite, and during the recovery period unless otherwise explicitly stated by me (or a person authorized to consent on my behalf). The surgeon or my attending physician will determine when the applicable recovery period ends for purposes of reinstating the DNAR order.  10. Patients having a sterilization procedure: I understand that if the procedure is successful the results will be permanent and it will therefore be impossible for me to inseminate, conceive, or bear children.  I also understand that the procedure is intended to result in sterility, although the result has not been guaranteed.   11. I acknowledge that my physician has explained sedation/analgesia administration to me including the risk and benefits I consent to the administration of sedation/analgesia as may be necessary or desirable in the judgment of my physician.    I CERTIFY THAT I HAVE READ AND FULLY UNDERSTAND THE ABOVE CONSENT TO OPERATION and/or OTHER PROCEDURE.     _________________________________________ _________________________________     ___________________________________  Signature of Patient     Signature of Responsible Person                   Printed Name of Responsible Person                              _________________________________________ ______________________________        ___________________________________  Signature of Witness         Date  Time         Relationship to Patient    STATEMENT OF PHYSICIAN My signature below affirms that prior to the time of the procedure; I have explained to the patient and/or his/her legal representative, the risks and benefits involved in the proposed treatment and any reasonable alternative to the proposed treatment. I have also explained the risks and benefits involved in refusal of the proposed treatment and  alternatives to the proposed treatment and have answered the patient's questions. If I have a significant financial interest in a co-management agreement or a significant financial interest in any product or implant, or other significant relationship used in this procedure/surgery, I have disclosed this and had a discussion with my patient.     _______________________________________________________________ _____________________________  (Signature of Physician)                                                                                         (Date)                                   (Time)  Patient Name: Denilson Hayes    : 12/15/1970   Printed: 3/4/2024      Medical Record #: [id number]                                              Page 1 of 1

## (undated) NOTE — IP AVS SNAPSHOT
[facility] - [facility]    [address], [place] ~ [phone]                Discharge Summary   3/3/2017    Karine Nunez           Admission Information        Provider Department    3/3/2017 Doug Staples MD [facility] Pre-O The medication that you received for sedation or general anesthesia can last up to 24 hours. Your judgmentand reflexes may be altered, even if you feel like your normal self.       We Recommend:   · Do not drive any motor vehicle or bicycle   · Avoid mowing leave the hospital    We are gathering information on the outcomes of our patients after surgery so we can continue to enhance our surgical program.      Genesis Hough and your surgeon are proudly participating in the [facility] [employer] [facility] [employer] - If you are a smoker or have smoked in the last 12 months, we encourage you to explore options for quitting.     - If you have concerns related to behavioral health issues or thoughts of harming yourself, contact [facility] a

## (undated) NOTE — Clinical Note
[address] [facility]  [address], [address] [facility]  [address] [facility] [phone]        Dear Chantelle Gonzáles,      I had the pleasure of seeing your patient, Trae Nesbitt on 1/19/2017. Below please find a summary of our visit.

## (undated) NOTE — Clinical Note
2017      Patient: Marquis Martin  : 12/15/1970 Visit date: 2017    Dear Coco Miller,      I examined your patient in follow-up today. Electrodiagnostic studies confirm severe bilateral carpal tunnel syndrome.   We will plan on left end

## (undated) NOTE — MR AVS SNAPSHOT
[address], [address] [place] [address] [phone]               Thank you for choosing us for your health care visit with Ping Cameron MD.  We are glad to serve you and happy to provide you with this summary Allergies as of Jan 26, 2017     No Known Allergies                   Current Medications          This list is accurate as of: 1/26/17  6:23 PM.  Always use your most recent med list.                Cyclobenzaprine HCl 10 MG Tabs   Take 10 mg by mouth as

## (undated) NOTE — MR AVS SNAPSHOT
[address] [address] [address], [address] [facility] [address] [phone]               Thank you for choosing us for your health care visit with Lesley Cornejo.   We are glad to serve you and happy to provide you with this summary of yo Commonly known as:  cyclobenzaprine           folic acid 1 MG Tabs   Take 2 tablets (2 mg total) by mouth once daily. Commonly known as:  FOLVITE           hydrocodone-acetaminophen 7.5-325 MG Tabs   Take one every 6 hours PRN pain.    Commonly known as: increments are effective and add up over the week   2 ½ hours per week – spread out over time Use a fausto to keep you motivated   Don’t forget strength training with weights and resistance Set goals and track your progress   You don’t need to join a gym.

## (undated) NOTE — LETTER
January 10, 2019     Isabelle Trejo  2013 [address] [facility]      Dear Jeannie Jackson:    Below are the results from your recent visit:    Resulted Orders   COMP METABOLIC PANEL (14)   Result Value Ref Range    Glucose 88 70 - 99 mg/dL    Sodium 134 (L) The sedimentation rate, which is a marker of inflammation, is normal.    Please contact the office if you wish to discuss these results directly with Dr. Nelson Vaca or myself, either over the phone or face-to-face in the office.      Kisha Zuñiga PA-C  Physi

## (undated) NOTE — MR AVS SNAPSHOT
[address] [address] [address], [address] [facility] [address] [phone]               Thank you for choosing us for your health care visit with Antonio Garcia.   We are glad to serve you and happy to provide you with this summary of yo

## (undated) NOTE — MR AVS SNAPSHOT
[address] [place], [place] [id number] [facility] [address] [facility] [address], [address] [place], [place] [id number] [phone]               Thank you for choosing us for your health care visit with Yessenia Chen MD.  We are glad to serve you and happy to provide you with this summary [employer]                  Visit [facility] online at  [email or web address]

## (undated) NOTE — LETTER
February 15, 2021     [facility]  [address] [facility]      Dear Peng Scott:    Below are the results from your recent visit:    Your liver enzymes are slightly elevated. These need to be repeated in one month.    Do not take any t HDL Cholesterol 56 40 - 59 mg/dL    Triglycerides 49 30 - 149 mg/dL    LDL Cholesterol 103 (H) <100 mg/dL    VLDL 10 0 - 30 mg/dL    Non HDL Chol 113 <130 mg/dL    FASTING Yes    TSH W REFLEX TO FREE T4   Result Value Ref Range    TSH 0.690 0.358 - 3.740

## (undated) NOTE — MR AVS SNAPSHOT
[address] [address]  [phone]               Thank you for choosing us for your health care visit with Benjamin Carlos MD.  We are glad to serve you and happy to provide you with this sum Allergies as of Feb 03, 2017     No Known Allergies                Today's Vital Signs     BP Pulse Height Weight BMI    137/92 mmHg 85 5' 11.5\" (1.816 m) 230 lb 14.4 oz (104.736 kg) 31.76 kg/m2         Current Medications          This list is ac